# Patient Record
Sex: FEMALE | Race: WHITE | NOT HISPANIC OR LATINO | Employment: FULL TIME | URBAN - METROPOLITAN AREA
[De-identification: names, ages, dates, MRNs, and addresses within clinical notes are randomized per-mention and may not be internally consistent; named-entity substitution may affect disease eponyms.]

---

## 2017-05-28 ENCOUNTER — HOSPITAL ENCOUNTER (EMERGENCY)
Facility: HOSPITAL | Age: 52
Discharge: HOME | End: 2017-05-28
Attending: EMERGENCY MEDICINE
Payer: COMMERCIAL

## 2017-05-28 VITALS
SYSTOLIC BLOOD PRESSURE: 129 MMHG | BODY MASS INDEX: 39.87 KG/M2 | RESPIRATION RATE: 18 BRPM | DIASTOLIC BLOOD PRESSURE: 70 MMHG | OXYGEN SATURATION: 97 % | TEMPERATURE: 99 F | HEIGHT: 63 IN | WEIGHT: 225 LBS | HEART RATE: 113 BPM

## 2017-05-28 DIAGNOSIS — J02.9 PHARYNGITIS, UNSPECIFIED ETIOLOGY: ICD-10-CM

## 2017-05-28 DIAGNOSIS — T78.40XA ALLERGIC REACTION CAUSED BY A DRUG, INITIAL ENCOUNTER: Primary | ICD-10-CM

## 2017-05-28 PROCEDURE — 96361 HYDRATE IV INFUSION ADD-ON: CPT

## 2017-05-28 PROCEDURE — 96374 THER/PROPH/DIAG INJ IV PUSH: CPT

## 2017-05-28 PROCEDURE — 99284 EMERGENCY DEPT VISIT MOD MDM: CPT | Mod: 25

## 2017-05-28 PROCEDURE — 25000003 PHARM REV CODE 250: Performed by: EMERGENCY MEDICINE

## 2017-05-28 RX ORDER — FAMOTIDINE 20 MG/1
20 TABLET, FILM COATED ORAL 2 TIMES DAILY
Qty: 20 TABLET | Refills: 0 | Status: ON HOLD | OUTPATIENT
Start: 2017-05-28 | End: 2019-09-09 | Stop reason: CLARIF

## 2017-05-28 RX ORDER — MODAFINIL 200 MG/1
200 TABLET ORAL DAILY
Status: ON HOLD | COMMUNITY
End: 2019-09-09 | Stop reason: CLARIF

## 2017-05-28 RX ORDER — DOCUSATE SODIUM 100 MG/1
100 CAPSULE, LIQUID FILLED ORAL 2 TIMES DAILY
COMMUNITY

## 2017-05-28 RX ORDER — ASPIRIN 81 MG/1
81 TABLET ORAL DAILY
COMMUNITY
End: 2019-09-08

## 2017-05-28 RX ORDER — PANTOPRAZOLE SODIUM 40 MG/1
40 TABLET, DELAYED RELEASE ORAL DAILY
Status: ON HOLD | COMMUNITY
End: 2019-09-09 | Stop reason: CLARIF

## 2017-05-28 RX ORDER — DIPHENHYDRAMINE HCL 25 MG
25 CAPSULE ORAL EVERY 6 HOURS PRN
Qty: 20 CAPSULE | Refills: 0 | Status: ON HOLD | OUTPATIENT
Start: 2017-05-28 | End: 2019-09-09 | Stop reason: CLARIF

## 2017-05-28 RX ORDER — ASCORBIC ACID 1000 MG
175 TABLET ORAL DAILY
COMMUNITY

## 2017-05-28 RX ORDER — FERROUS SULFATE, DRIED 160(50) MG
1 TABLET, EXTENDED RELEASE ORAL 2 TIMES DAILY WITH MEALS
COMMUNITY
End: 2019-09-08

## 2017-05-28 RX ORDER — FAMOTIDINE 10 MG/ML
20 INJECTION INTRAVENOUS
Status: COMPLETED | OUTPATIENT
Start: 2017-05-28 | End: 2017-05-28

## 2017-05-28 RX ORDER — AZITHROMYCIN 250 MG/1
250 TABLET, FILM COATED ORAL DAILY
Qty: 6 TABLET | Refills: 0 | Status: SHIPPED | OUTPATIENT
Start: 2017-05-28 | End: 2018-11-14

## 2017-05-28 RX ORDER — PREDNISONE 10 MG/1
10 TABLET ORAL DAILY
Qty: 21 TABLET | Refills: 0 | Status: SHIPPED | OUTPATIENT
Start: 2017-05-28 | End: 2017-06-07

## 2017-05-28 RX ORDER — SODIUM CHLORIDE 9 MG/ML
1000 INJECTION, SOLUTION INTRAVENOUS
Status: COMPLETED | OUTPATIENT
Start: 2017-05-28 | End: 2017-05-28

## 2017-05-28 RX ORDER — DICYCLOMINE HYDROCHLORIDE 10 MG/1
10 CAPSULE ORAL
COMMUNITY
End: 2019-09-08

## 2017-05-28 RX ADMIN — FAMOTIDINE 20 MG: 10 INJECTION, SOLUTION INTRAVENOUS at 05:05

## 2017-05-28 RX ADMIN — SODIUM CHLORIDE 1000 ML: 0.9 INJECTION, SOLUTION INTRAVENOUS at 05:05

## 2017-05-28 NOTE — ED NOTES
Level of Consciousness: Patient is awake, alert, oriented to person, place, time, and situation.   Appearance: Pt resting comfortably in stretcher, no acute distress at this time. Clothing appropriately placed and clean. Hygiene is appropriate.   Skin: Skin is warm, dry, and intact. Skin turgor is normal/elastic. Mucous membranes moist. Skin color is normal for ethnicity. No skin breakdown noted. No redness or hives noted, slight edema noted to fingers bilaterally.    Musculoskeletal: Moves all extremities well. Full active ROM. No deformities noted. Denies any weakness. Gait steady, ambulates without use of assistive devices.   Respiratory: Airway open and patent. Respirations equal and unlabored. Breath sounds clear to auscultation. Denies any SOB.   Cardiac: Regular rate and rhythm. No peripheral edema noted. Radial and pedal pulses present and normal. Capillary refill is within normal limits. Denies chest pain.   GI: Abdomen soft. Bowel sounds present and active in all quads. Abdomen symmetric with no distention noted. Denies any N/V/D.   Neurological: Symmetrical expressions noted to face. Equal bilateral . Normal sensation reported to all extremities. No obvious neurological deficits noted.   Psychosocial: Speech spontaneous, clear, and coherent.

## 2017-05-28 NOTE — ED NOTES
Patient placed on continuous cardiac monitor, automatic blood pressure cuff and continuous pulse oximeter.  Sinus tach 120

## 2017-05-28 NOTE — ED NOTES
Pt states she was given rocephin and decadron at urgent care, shortly thereafter began experiencing hives/rash with shortness of breath.  Ambulance called, pt transported to [facility].  Pt given benadryl 50 mg and Epi 0.3 mg SC.  Pt states symptoms improved.  Upon arrival to [facility], pt in no distress, states she was feeling much better.  Family at bedside.  Pt denies cp, states her breathing has improved dramatically

## 2017-05-28 NOTE — ED NOTES
Bed: 11  Expected date:   Expected time:   Means of arrival:   Comments:  JANETH Ayala NP  05/28/17 1430

## 2017-05-28 NOTE — ED PROVIDER NOTES
SCRIBE #1 NOTE: I, Lesa Denis, am scribing for, and in the presence of, Josr Garcia Jr., MD. I have scribed the entire note.      History      Chief Complaint   Patient presents with    Allergic Reaction       Review of patient's allergies indicates:   Allergen Reactions    Rocephin [ceftriaxone] Shortness Of Breath    Augmentin [amoxicillin-pot clavulanate] Rash        HPI   HPI    2017, 4:53 PM   History obtained from the patient      History of Present Illness: Beatriz Valente is a 51 y.o. female patient who presents to the Emergency Department for allergic reaction which onset gradually today. Symptoms are constant and moderate in severity. Pt states that she was given a rocephin shot at urgent care. Pt was given steroids, antihistamines, and breathing tx en route to ED. No mitigating or exacerbating factors reported. Associated sxs include generalized itching, rashes, SOB and swelling to lips, bilateral hands, and bilateral feet. Patient denies any fever, n/v/d, abd pain, CP, trouble swallowing, chest tightness, wheezing, coughing, trouble swallowing, numbness, weakness, HA, dizziness, weakness, back pain, flank pain, dysuria, hematuria, and all other sxs at this time. No further complaints or concerns at this time.         Arrival mode: EMS    PCP: Bryson Nova MD       Past Medical History:  Past Medical History:   Diagnosis Date    GERD (gastroesophageal reflux disease)     Sleep apnea        Past Surgical History:  Past Surgical History:   Procedure Laterality Date    BREAST LUMPECTOMY Right     CARPAL TUNNEL RELEASE       SECTION      CHOLECYSTECTOMY      TONSILLECTOMY  1969         Family History:  History reviewed. No pertinent family history.    Social History:  Social History     Social History Main Topics    Smoking status: Current Every Day Smoker     Packs/day: 1.00     Types: Cigarettes    Smokeless tobacco: Unknown    Alcohol use Yes    Drug use: No    Sexual  activity: Yes       ROS   Review of Systems   Constitutional: Negative for fever.   HENT: Positive for facial swelling (lips). Negative for sore throat and trouble swallowing.    Respiratory: Positive for shortness of breath. Negative for cough, chest tightness and wheezing.    Cardiovascular: Negative for chest pain.   Gastrointestinal: Negative for abdominal pain, constipation, diarrhea, nausea and vomiting.   Genitourinary: Negative for decreased urine volume, difficulty urinating, dysuria, flank pain and hematuria.   Musculoskeletal: Negative for back pain.        (+) bilateral feet swelling  (+) bilateral hand swelling   Skin: Positive for rash.        (+) generalized itching   Neurological: Negative for dizziness, weakness, numbness and headaches.   Hematological: Does not bruise/bleed easily.       Physical Exam      Initial Vitals [05/28/17 1643]   BP Pulse Resp Temp SpO2   104/80 (!) 126 20 98.1 °F (36.7 °C) 97 %      Physical Exam  Nursing Notes and Vital Signs Reviewed.  Constitutional: Patient is in no acute distress. Well-developed and well-nourished.  Head: Atraumatic. Normocephalic.  Eyes: PERRL. EOM intact. Conjunctivae are not pale. No scleral icterus.  ENT: Mucous membranes are moist. Oropharynx is clear and symmetric.    Neck: Supple. Full ROM. No lymphadenopathy.  Cardiovascular: Regular rate. Regular rhythm. No murmurs, rubs, or gallops. Distal pulses are 2+ and symmetric.  Pulmonary/Chest: No respiratory distress. Clear to auscultation bilaterally. No wheezing, rales, or rhonchi.  Abdominal: Soft and non-distended.  There is no tenderness.  No rebound, guarding, or rigidity. Good bowel sounds.  Musculoskeletal: Moves all extremities. No obvious deformities. No edema. No calf tenderness.  Skin: Warm and dry. Red in the face. Macular rash to the bilateral arms.   Neurological:  Alert, awake, and appropriate.  Normal speech.  No acute focal neurological deficits are appreciated.  Psychiatric:  "Normal affect. Good eye contact. Appropriate in content.    ED Course    Procedures  ED Vital Signs:  Vitals:    05/28/17 1643 05/28/17 1801 05/28/17 1841   BP: 104/80 126/75 137/73   Pulse: (!) 126 (!) 114 (!) 120   Resp: 20 18 18   Temp: 98.1 °F (36.7 °C)     TempSrc: Oral     SpO2: 97% 98% 98%   Weight: 102.1 kg (225 lb)     Height: 5' 3" (1.6 m)              The Emergency Provider reviewed the vital signs and test results, which are outlined above.    ED Discussion     6:30 PM: Re-evaluated pt. Pt is resting comfortably and is in no acute distress.  Pt states that she is feeling better.  D/w pt all pertinent results. D/w pt any concerns expressed at this time. Answered all questions. Pt expresses understanding at this time.    6:52 PM: Reassessed pt at this time. Pt is awake and alert. Pt states her condition has improved at this time. Discussed with pt all pertinent ED information and results. Discussed pt dx and plan of tx. Gave pt all f/u and return to the ED instructions. All questions and concerns were addressed at this time. Pt expresses understanding of information and instructions, and is comfortable with plan to discharge. Pt is stable for discharge.      ED Medication(s):  Medications   0.9%  NaCl infusion (1,000 mLs Intravenous New Bag 5/28/17 1730)   famotidine (PF) injection 20 mg (20 mg Intravenous Given 5/28/17 1730)       New Prescriptions    AZITHROMYCIN (Z-SHABANA) 250 MG TABLET    Take 1 tablet (250 mg total) by mouth once daily. Take first 2 tablets together, then 1 every day until finished.    DIPHENHYDRAMINE (BENADRYL) 25 MG CAPSULE    Take 1 each (25 mg total) by mouth every 6 (six) hours as needed for Itching or Allergies.    FAMOTIDINE (PEPCID) 20 MG TABLET    Take 1 tablet (20 mg total) by mouth 2 (two) times daily.    PREDNISONE (DELTASONE) 10 MG TABLET    Take 1 tablet (10 mg total) by mouth once daily. Take 4 tabs x 3 days, then  Take 2 tabs x 3 days, then   Take 1 tab x 3 days. "       Follow-up Information     Bryson Nova MD. Schedule an appointment as soon as possible for a visit in 2 days.    Specialty:  Family Medicine  Why:  As needed- do nt take any penicillin or related antibiotic  Contact information:  [id number]  [place] [address]  [phone]                     Medical Decision Making              Scribe Attestation:   Scribe #1: I performed the above scribed service and the documentation accurately describes the services I performed. I attest to the accuracy of the note.    Attending:   Physician Attestation Statement for Scribe #1: I, Josr Garcia Jr., MD, personally performed the services described in this documentation, as scribed by Lesa Denis, in my presence, and it is both accurate and complete.          Clinical Impression       ICD-10-CM ICD-9-CM   1. Allergic reaction caused by a drug, initial encounter T78.40XA 995.27   2. Pharyngitis, unspecified etiology J02.9 462       Disposition:   Disposition: Discharged  Condition: Stable         Josr Garcia Jr., MD  05/29/17 0102

## 2017-05-28 NOTE — ED NOTES
Pt resting comfortably, states no needs at this time.  Pt states she feels well enough to return home.  MD notified

## 2017-05-29 NOTE — ED NOTES
MD Garcia aware of HR. MD states to continue with discharge. Pt states that she is ready for discharge. NAD noted. RR e/u, airway open and patent. Pt denies CP or SOB. Will continue with discharge.

## 2018-11-14 PROBLEM — E78.2 MIXED HYPERLIPIDEMIA: Status: ACTIVE | Noted: 2018-11-14

## 2018-11-14 PROBLEM — C79.51 LUNG CANCER METASTATIC TO BONE: Status: ACTIVE | Noted: 2018-11-14

## 2018-11-14 PROBLEM — Z72.0 TOBACCO ABUSE: Status: ACTIVE | Noted: 2018-11-14

## 2018-11-14 PROBLEM — C34.90 LUNG CANCER METASTATIC TO BONE: Status: ACTIVE | Noted: 2018-11-14

## 2019-09-08 ENCOUNTER — HOSPITAL ENCOUNTER (INPATIENT)
Facility: HOSPITAL | Age: 54
LOS: 4 days | Discharge: HOME | DRG: 023 | End: 2019-09-12
Attending: EMERGENCY MEDICINE | Admitting: PSYCHIATRY & NEUROLOGY
Payer: COMMERCIAL

## 2019-09-08 ENCOUNTER — HOSPITAL ENCOUNTER (EMERGENCY)
Facility: HOSPITAL | Age: 54
Discharge: ANOTHER HOSPITAL | End: 2019-09-08
Attending: EMERGENCY MEDICINE
Payer: COMMERCIAL

## 2019-09-08 VITALS
DIASTOLIC BLOOD PRESSURE: 95 MMHG | BODY MASS INDEX: 34.16 KG/M2 | RESPIRATION RATE: 22 BRPM | HEART RATE: 116 BPM | TEMPERATURE: 99 F | OXYGEN SATURATION: 100 % | HEIGHT: 63 IN | SYSTOLIC BLOOD PRESSURE: 143 MMHG | WEIGHT: 192.81 LBS

## 2019-09-08 DIAGNOSIS — Z00.00 EVALUATION BY MEDICAL SERVICE REQUIRED: ICD-10-CM

## 2019-09-08 DIAGNOSIS — I63.9 CVA (CEREBRAL VASCULAR ACCIDENT): ICD-10-CM

## 2019-09-08 DIAGNOSIS — C34.90 LUNG CANCER METASTATIC TO BONE: ICD-10-CM

## 2019-09-08 DIAGNOSIS — I63.511 ACUTE ISCHEMIC RIGHT MCA STROKE: ICD-10-CM

## 2019-09-08 DIAGNOSIS — I63.9 STROKE: ICD-10-CM

## 2019-09-08 DIAGNOSIS — C79.51 LUNG CANCER METASTATIC TO BONE: ICD-10-CM

## 2019-09-08 DIAGNOSIS — I63.411 CEREBRAL INFARCTION DUE TO EMBOLISM OF RIGHT MIDDLE CEREBRAL ARTERY: Primary | ICD-10-CM

## 2019-09-08 PROBLEM — E11.9 DIABETES MELLITUS TYPE II, CONTROLLED: Status: ACTIVE | Noted: 2019-09-08

## 2019-09-08 PROBLEM — E78.5 HLD (HYPERLIPIDEMIA): Status: ACTIVE | Noted: 2019-09-08

## 2019-09-08 PROBLEM — D69.6 THROMBOCYTOPENIA: Status: ACTIVE | Noted: 2019-09-08

## 2019-09-08 LAB
ABO + RH BLD: NORMAL
ALBUMIN SERPL BCP-MCNC: 3.3 G/DL (ref 3.5–5.2)
ALP SERPL-CCNC: 113 U/L (ref 55–135)
ALT SERPL W/O P-5'-P-CCNC: 19 U/L (ref 10–44)
ANION GAP SERPL CALC-SCNC: 15 MMOL/L (ref 8–16)
ANISOCYTOSIS BLD QL SMEAR: SLIGHT
AST SERPL-CCNC: 31 U/L (ref 10–40)
BASOPHILS # BLD AUTO: ABNORMAL K/UL (ref 0–0.2)
BASOPHILS NFR BLD: 0 % (ref 0–1.9)
BILIRUB SERPL-MCNC: 0.4 MG/DL (ref 0.1–1)
BLD GP AB SCN CELLS X3 SERPL QL: NORMAL
BUN SERPL-MCNC: 15 MG/DL (ref 6–20)
CALCIUM SERPL-MCNC: 9.8 MG/DL (ref 8.7–10.5)
CHLORIDE SERPL-SCNC: 104 MMOL/L (ref 95–110)
CHOLEST SERPL-MCNC: 181 MG/DL (ref 120–199)
CHOLEST SERPL-MCNC: 199 MG/DL (ref 120–199)
CHOLEST/HDLC SERPL: 4.9 {RATIO} (ref 2–5)
CHOLEST/HDLC SERPL: 4.9 {RATIO} (ref 2–5)
CO2 SERPL-SCNC: 23 MMOL/L (ref 23–29)
CREAT SERPL-MCNC: 1 MG/DL (ref 0.5–1.4)
DACRYOCYTES BLD QL SMEAR: ABNORMAL
DIFFERENTIAL METHOD: ABNORMAL
EOSINOPHIL # BLD AUTO: ABNORMAL K/UL (ref 0–0.5)
EOSINOPHIL NFR BLD: 4 % (ref 0–8)
ERYTHROCYTE [DISTWIDTH] IN BLOOD BY AUTOMATED COUNT: 16.7 % (ref 11.5–14.5)
EST. GFR  (AFRICAN AMERICAN): >60 ML/MIN/1.73 M^2
EST. GFR  (NON AFRICAN AMERICAN): >60 ML/MIN/1.73 M^2
ESTIMATED AVG GLUCOSE: 82 MG/DL (ref 68–131)
GLUCOSE SERPL-MCNC: 172 MG/DL (ref 70–110)
HBA1C MFR BLD HPLC: 4.5 % (ref 4–5.6)
HCT VFR BLD AUTO: 33.1 % (ref 37–48.5)
HDLC SERPL-MCNC: 37 MG/DL (ref 40–75)
HDLC SERPL-MCNC: 41 MG/DL (ref 40–75)
HDLC SERPL: 20.4 % (ref 20–50)
HDLC SERPL: 20.6 % (ref 20–50)
HGB BLD-MCNC: 10.7 G/DL (ref 12–16)
INR PPP: 0.9 (ref 0.8–1.2)
LDLC SERPL CALC-MCNC: 107.6 MG/DL (ref 63–159)
LDLC SERPL CALC-MCNC: 119.4 MG/DL (ref 63–159)
LYMPHOCYTES # BLD AUTO: ABNORMAL K/UL (ref 1–4.8)
LYMPHOCYTES NFR BLD: 26 % (ref 18–48)
MCH RBC QN AUTO: 33.4 PG (ref 27–31)
MCHC RBC AUTO-ENTMCNC: 32.3 G/DL (ref 32–36)
MCV RBC AUTO: 103 FL (ref 82–98)
MONOCYTES # BLD AUTO: ABNORMAL K/UL (ref 0.3–1)
MONOCYTES NFR BLD: 0 % (ref 4–15)
NEUTROPHILS NFR BLD: 68 % (ref 38–73)
NEUTS BAND NFR BLD MANUAL: 2 %
NONHDLC SERPL-MCNC: 144 MG/DL
NONHDLC SERPL-MCNC: 158 MG/DL
OVALOCYTES BLD QL SMEAR: ABNORMAL
PLATELET # BLD AUTO: 40 K/UL (ref 150–350)
PLATELET BLD QL SMEAR: ABNORMAL
PMV BLD AUTO: ABNORMAL FL (ref 9.2–12.9)
POCT GLUCOSE: 115 MG/DL (ref 70–110)
POCT GLUCOSE: 155 MG/DL (ref 70–110)
POCT GLUCOSE: 99 MG/DL (ref 70–110)
POIKILOCYTOSIS BLD QL SMEAR: SLIGHT
POTASSIUM SERPL-SCNC: 4 MMOL/L (ref 3.5–5.1)
PROT SERPL-MCNC: 7.6 G/DL (ref 6–8.4)
PROTHROMBIN TIME: 9.9 SEC (ref 9–12.5)
RBC # BLD AUTO: 3.2 M/UL (ref 4–5.4)
SODIUM SERPL-SCNC: 142 MMOL/L (ref 136–145)
TARGETS BLD QL SMEAR: ABNORMAL
TRIGL SERPL-MCNC: 182 MG/DL (ref 30–150)
TRIGL SERPL-MCNC: 193 MG/DL (ref 30–150)
TSH SERPL DL<=0.005 MIU/L-ACNC: 1.69 UIU/ML (ref 0.4–4)
TSH SERPL DL<=0.005 MIU/L-ACNC: 2.4 UIU/ML (ref 0.4–4)
WBC # BLD AUTO: 1.72 K/UL (ref 3.9–12.7)

## 2019-09-08 PROCEDURE — 85610 PROTHROMBIN TIME: CPT

## 2019-09-08 PROCEDURE — 84443 ASSAY THYROID STIM HORMONE: CPT

## 2019-09-08 PROCEDURE — 63600175 PHARM REV CODE 636 W HCPCS: Mod: JG | Performed by: EMERGENCY MEDICINE

## 2019-09-08 PROCEDURE — 20000000 HC ICU ROOM

## 2019-09-08 PROCEDURE — 93010 EKG 12-LEAD: ICD-10-PCS | Mod: ,,, | Performed by: INTERNAL MEDICINE

## 2019-09-08 PROCEDURE — 84443 ASSAY THYROID STIM HORMONE: CPT | Mod: 91

## 2019-09-08 PROCEDURE — 94761 N-INVAS EAR/PLS OXIMETRY MLT: CPT

## 2019-09-08 PROCEDURE — 25500020 PHARM REV CODE 255: Performed by: EMERGENCY MEDICINE

## 2019-09-08 PROCEDURE — 82962 GLUCOSE BLOOD TEST: CPT | Mod: 59

## 2019-09-08 PROCEDURE — 96374 THER/PROPH/DIAG INJ IV PUSH: CPT

## 2019-09-08 PROCEDURE — 80061 LIPID PANEL: CPT | Mod: 91

## 2019-09-08 PROCEDURE — 99223 PR INITIAL HOSPITAL CARE,LEVL III: ICD-10-PCS | Mod: ,,, | Performed by: PSYCHIATRY & NEUROLOGY

## 2019-09-08 PROCEDURE — 63600175 PHARM REV CODE 636 W HCPCS: Performed by: RADIOLOGY

## 2019-09-08 PROCEDURE — 93005 ELECTROCARDIOGRAM TRACING: CPT

## 2019-09-08 PROCEDURE — 99900035 HC TECH TIME PER 15 MIN (STAT)

## 2019-09-08 PROCEDURE — 85007 BL SMEAR W/DIFF WBC COUNT: CPT

## 2019-09-08 PROCEDURE — 99223 1ST HOSP IP/OBS HIGH 75: CPT | Mod: ,,, | Performed by: PSYCHIATRY & NEUROLOGY

## 2019-09-08 PROCEDURE — 80061 LIPID PANEL: CPT

## 2019-09-08 PROCEDURE — 85027 COMPLETE CBC AUTOMATED: CPT

## 2019-09-08 PROCEDURE — 27000221 HC OXYGEN, UP TO 24 HOURS

## 2019-09-08 PROCEDURE — 99284 EMERGENCY DEPT VISIT MOD MDM: CPT | Mod: ,,, | Performed by: EMERGENCY MEDICINE

## 2019-09-08 PROCEDURE — 99291 CRITICAL CARE FIRST HOUR: CPT | Mod: 25

## 2019-09-08 PROCEDURE — 99285 EMERGENCY DEPT VISIT HI MDM: CPT | Mod: 25

## 2019-09-08 PROCEDURE — 25000003 PHARM REV CODE 250: Performed by: PSYCHIATRY & NEUROLOGY

## 2019-09-08 PROCEDURE — 93010 ELECTROCARDIOGRAM REPORT: CPT | Mod: ,,, | Performed by: INTERNAL MEDICINE

## 2019-09-08 PROCEDURE — 86901 BLOOD TYPING SEROLOGIC RH(D): CPT

## 2019-09-08 PROCEDURE — 83036 HEMOGLOBIN GLYCOSYLATED A1C: CPT

## 2019-09-08 PROCEDURE — 99900026 HC AIRWAY MAINTENANCE (STAT)

## 2019-09-08 PROCEDURE — 80053 COMPREHEN METABOLIC PANEL: CPT

## 2019-09-08 PROCEDURE — 63600175 PHARM REV CODE 636 W HCPCS: Performed by: PSYCHIATRY & NEUROLOGY

## 2019-09-08 PROCEDURE — 99284 PR EMERGENCY DEPT VISIT,LEVEL IV: ICD-10-PCS | Mod: ,,, | Performed by: EMERGENCY MEDICINE

## 2019-09-08 PROCEDURE — 25500020 PHARM REV CODE 255: Performed by: PSYCHIATRY & NEUROLOGY

## 2019-09-08 RX ORDER — ONDANSETRON 2 MG/ML
INJECTION INTRAMUSCULAR; INTRAVENOUS CODE/TRAUMA/SEDATION MEDICATION
Status: COMPLETED | OUTPATIENT
Start: 2019-09-08 | End: 2019-09-08

## 2019-09-08 RX ORDER — FENTANYL CITRATE 50 UG/ML
INJECTION, SOLUTION INTRAMUSCULAR; INTRAVENOUS
Status: DISPENSED
Start: 2019-09-08 | End: 2019-09-09

## 2019-09-08 RX ORDER — SODIUM CHLORIDE 0.9 % (FLUSH) 0.9 %
10 SYRINGE (ML) INJECTION
Status: DISCONTINUED | OUTPATIENT
Start: 2019-09-08 | End: 2019-09-12 | Stop reason: HOSPADM

## 2019-09-08 RX ORDER — IODIXANOL 320 MG/ML
200 INJECTION, SOLUTION INTRAVASCULAR
Status: COMPLETED | OUTPATIENT
Start: 2019-09-08 | End: 2019-09-08

## 2019-09-08 RX ORDER — OXYCODONE HYDROCHLORIDE 5 MG/1
5 TABLET ORAL EVERY 4 HOURS PRN
Status: DISCONTINUED | OUTPATIENT
Start: 2019-09-08 | End: 2019-09-12 | Stop reason: HOSPADM

## 2019-09-08 RX ORDER — ACETAMINOPHEN 325 MG/1
650 TABLET ORAL EVERY 6 HOURS PRN
Status: DISCONTINUED | OUTPATIENT
Start: 2019-09-08 | End: 2019-09-12 | Stop reason: HOSPADM

## 2019-09-08 RX ORDER — MIDAZOLAM HYDROCHLORIDE 1 MG/ML
INJECTION INTRAMUSCULAR; INTRAVENOUS CODE/TRAUMA/SEDATION MEDICATION
Status: COMPLETED | OUTPATIENT
Start: 2019-09-08 | End: 2019-09-08

## 2019-09-08 RX ORDER — NICARDIPINE HYDROCHLORIDE 0.2 MG/ML
5 INJECTION INTRAVENOUS CONTINUOUS
Status: DISCONTINUED | OUTPATIENT
Start: 2019-09-08 | End: 2019-09-10

## 2019-09-08 RX ORDER — FENTANYL CITRATE 50 UG/ML
12.5 INJECTION, SOLUTION INTRAMUSCULAR; INTRAVENOUS ONCE
Status: COMPLETED | OUTPATIENT
Start: 2019-09-08 | End: 2019-09-08

## 2019-09-08 RX ORDER — LEVOTHYROXINE SODIUM 25 UG/1
25 TABLET ORAL DAILY
COMMUNITY

## 2019-09-08 RX ORDER — FENTANYL 25 UG/1
1 PATCH TRANSDERMAL
Status: ON HOLD | COMMUNITY
End: 2019-09-09 | Stop reason: CLARIF

## 2019-09-08 RX ADMIN — OXYCODONE HYDROCHLORIDE 5 MG: 5 TABLET ORAL at 07:09

## 2019-09-08 RX ADMIN — MIDAZOLAM HYDROCHLORIDE 1 MG: 1 INJECTION, SOLUTION INTRAMUSCULAR; INTRAVENOUS at 04:09

## 2019-09-08 RX ADMIN — ALTEPLASE 71 MG: KIT at 01:09

## 2019-09-08 RX ADMIN — IODIXANOL 60 ML: 320 INJECTION, SOLUTION INTRAVASCULAR at 05:09

## 2019-09-08 RX ADMIN — ACETAMINOPHEN 650 MG: 325 TABLET ORAL at 07:09

## 2019-09-08 RX ADMIN — OXYCODONE HYDROCHLORIDE 5 MG: 5 TABLET ORAL at 11:09

## 2019-09-08 RX ADMIN — IOHEXOL 100 ML: 350 INJECTION, SOLUTION INTRAVENOUS at 03:09

## 2019-09-08 RX ADMIN — ONDANSETRON 4 MG: 2 INJECTION INTRAMUSCULAR; INTRAVENOUS at 04:09

## 2019-09-08 RX ADMIN — FENTANYL CITRATE 12.5 MCG: 50 INJECTION INTRAMUSCULAR; INTRAVENOUS at 05:09

## 2019-09-08 NOTE — HPI
54 y/o woman with a medical history of small cell lung carcinoma (with possible metastasis to the brain receiving chemo and radiation), GERD, HLD and DM was brought into the ER at [facility] for sudden onset of left sided facial droop with left sided hemiplegia when she was visiting her daughter who was in labor. Last known normal is 12:30 PM today.  Patient denied any chest pain, SOB, neck pain or headache.    During telestroke conference NIHSS was 15, VAN +, and patient was within the tPA window. After extensive conversation with the family regarding increased ICH risk with cancer and potential metastasis patient and family was transferred here for potential IR intervention. Upon arrival stroke NIHSS still 16. CTA multiphase done showed right M2 occlusion. Decision was made to do thrombectomy.

## 2019-09-08 NOTE — SUBJECTIVE & OBJECTIVE
Past Medical History:   Diagnosis Date    Cancer 2018    Lung    Diabetes mellitus, type 2     Steriod induced, years ago, now glucose up and down    GERD (gastroesophageal reflux disease)     Hyperlipidemia     Sleep apnea      Past Surgical History:   Procedure Laterality Date    BONE BIOPSY  2018    Sacral bone    BREAST BIOPSY Right 2014    BREAST LUMPECTOMY Right     BREAST LUMPECTOMY Right 2014    CARPAL TUNNEL RELEASE       SECTION      CHOLECYSTECTOMY      DILATION AND CURETTAGE OF UTERUS  1987    ENDOMETRIAL ABLATION  02/15/2011    HAND SURGERY Left 2007    HAND SURGERY Right 2008    HEMORRHOID SURGERY  2010    LUNG BIOPSY Right 2018    MOUTH SURGERY   and     cyst removal    SALIVARY GLAND SURGERY Left 2018    Biopsy    THROAT SURGERY  10/1983    Remove vocal cord nodule    TONSILLECTOMY  1969    TUNNELED VENOUS PORT PLACEMENT  2018     History reviewed. No pertinent family history.  Social History     Tobacco Use    Smoking status: Current Every Day Smoker     Packs/day: 0.50     Types: Cigarettes     Start date:     Smokeless tobacco: Never Used   Substance Use Topics    Alcohol use: Yes     Comment: Occassional    Drug use: No     Review of patient's allergies indicates:   Allergen Reactions    Rocephin [ceftriaxone] Shortness Of Breath    Augmentin [amoxicillin-pot clavulanate] Rash       Medications: I have reviewed the current medication administration record.      (Not in a hospital admission)    Review of Systems   Constitutional: Negative for chills, diaphoresis, fatigue and fever.   Cardiovascular: Negative for chest pain, palpitations and leg swelling.   Musculoskeletal: Positive for gait problem. Negative for arthralgias, back pain, joint swelling, myalgias, neck pain and neck stiffness.   Skin: Negative for color change, pallor and rash.   Neurological: Positive for facial asymmetry,  speech difficulty and weakness. Negative for dizziness, tremors, seizures, syncope, light-headedness, numbness and headaches.     Objective:     Vital Signs (Most Recent):  Pulse: 99 (09/08/19 1651)  Resp: 20 (09/08/19 1651)  BP: 136/85 (09/08/19 1651)  SpO2: 100 % (09/08/19 1651)    Vital Signs Range (Last 24H):  Temp:  [98.5 °F (36.9 °C)-99.2 °F (37.3 °C)]   Pulse:  []   Resp:  [10-22]   BP: (126-171)/(59-98)   SpO2:  [94 %-100 %]     Physical Exam   Constitutional: She is oriented to person, place, and time. She appears well-developed and well-nourished.   Neurological: She is alert and oriented to person, place, and time. A cranial nerve deficit and sensory deficit is present. She exhibits abnormal muscle tone. Coordination abnormal.   Skin: She is not diaphoretic.       Neurological Exam:   LOC: alert  Attention Span: Good   Language: Expressive aphasia  Articulation: Dysarthria  Orientation: Person, Place, Time   Visual Fields: Visual neglect  EOM (CN III, IV, VI): Horizontal gaze paralysis   Pupils (CN II, III): PERRL  Facial Sensation (CN V): Facial sensory loss  Facial Movement (CN VII): Upper & lower facial weakness on the Left  Reflexes: 2+ throughout  Motor: Arm left  Plegia 0/5  Leg left  Plegia 0/5  Arm right  Paresis: 4/5  Leg right Paresis: 4/5  Cebellar: No evidence of appendicular or axial ataxia  Sensation: Enmanuel-hypoesthesia left  Tone: Flaccid  LUE  and LLE      Laboratory:  CMP:   Recent Labs   Lab 09/08/19  1313   CALCIUM 9.8   ALBUMIN 3.3*   PROT 7.6      K 4.0   CO2 23      BUN 15   CREATININE 1.0   ALKPHOS 113   ALT 19   AST 31   BILITOT 0.4     CBC:   Recent Labs   Lab 09/08/19  1313   WBC 1.72*   RBC 3.20*   HGB 10.7*   HCT 33.1*   PLT 40*   *   MCH 33.4*   MCHC 32.3     Lipid Panel: No results for input(s): CHOL, LDLCALC, HDL, TRIG in the last 168 hours.  Coagulation:   Recent Labs   Lab 09/08/19  1313   INR 0.9     Hgb A1C: No results for input(s): HGBA1C in the  last 168 hours.  TSH:   Recent Labs   Lab 09/08/19  1313   TSH 2.400       Diagnostic Results:      Brain imaging:      No CT evidence of acute intracranial abnormality.    Vessel Imaging:        Abrupt occlusion of the lateral M2 segment of the right MCA.  No significant parenchymal hypoattenuation, edema/ mass effect, or hemorrhage.    Cardiac Evaluation:   TTE pending

## 2019-09-08 NOTE — ASSESSMENT & PLAN NOTE
53 y.o woman with a medical history of small cell lung carcinoma (currently receiving chemo and radiation with bony metastasis), HTN, DM2, HLD, current every day smoker and BASSAM presented to [facility] with sudden onset of facial droop and left sided plegia, NIHSS 16, VAN (+), GCS 15. Decision was made to give tPA at  and transfer to [facility] for thrombectomy. Upon arrival CTA multiphase showed right M2 occlusion. Decision was made to do thrombectomy. Will be admitted to [facility] post thrombectomy. Possible etiology embolic 2/2 her current risk factors as well as hypercoagulable from her cancer.      Antithrombotics for secondary stroke prevention: Antiplatelets: None: Hold all Antithrombotics x 24 hours after IV t-PA administration    Statins for secondary stroke prevention and hyperlipidemia, if present:   Statins: Atorvastatin- 80 mg daily    Aggressive risk factor modification: HTN, Smoking, DM, HLD, lung cancer     Rehab efforts: The patient has been evaluated by a stroke team provider and the therapy needs have been fully considered based off the presenting complaints and exam findings. The following therapy evaluations are needed: PT evaluate and treat, OT evaluate and treat, SLP evaluate and treat, PM&R evaluate for appropriate placement    Diagnostics ordered/pending: TTE to assess cardiac function/status     VTE prophylaxis: None: Reason for No Pharmacological VTE Prophylaxis: Mechanical prophylaxis: Place SCDs- hold VTE prophylaxis 24 hours post tpa     BP parameters: Infarct: Post sucessful thrombectomy, SBP <140

## 2019-09-08 NOTE — H&P
[facility]  Neurocritical Care  Progress Note    Admit Date: 2019  Service Date: 2019  Length of Stay: 0    Subjective:     Chief Complaint: Acute ischemic right MCA stroke    History of Present Illness: Pt is a 54 yo female with a PMHx of HTN, HLD, DM II, BASSAM, and SCLC with mets seen on last MRI () presents from OSH to [facility] for RMCA syndrome.  Pt was with her daughter in [place] when the pt acutely presented with left-sided weakness, facial droop, and slurred speech. Telestroke was consulted. Pt presented with an NIHSS 16, VAN (+), GCS (15). The risk of  Increased hemorrhage and tPA administration to pt with cancer were presented and discussed. Pt and family decided to receive tPA (decision to treat [id number]) treatment and transfe to [facility] for higher level of care with potential intervention. At [facility], imaging was performed and pt was found to have a R M2 occlusion. Pt was taken for IR for thrombectomy. Pt admitted to [facility] for higher of care and monitoring post procedure.     Pt history was obtained per chart review.      Hospital Course: 2019 tPA 1326 OSH, transfer to [facility], pt taken for IR, admit to [facility]         Past Medical History:   Diagnosis Date    Cancer 2018    Lung    Diabetes mellitus, type 2     Steriod induced, years ago, now glucose up and down    GERD (gastroesophageal reflux disease)     Hyperlipidemia     Sleep apnea      Past Surgical History:   Procedure Laterality Date    BONE BIOPSY  2018    Sacral bone    BREAST BIOPSY Right 2014    BREAST LUMPECTOMY Right     BREAST LUMPECTOMY Right 2014    CARPAL TUNNEL RELEASE       SECTION      CHOLECYSTECTOMY      DILATION AND CURETTAGE OF UTERUS  1987    ENDOMETRIAL ABLATION  02/15/2011    HAND SURGERY Left 2007    HAND SURGERY Right 2008    HEMORRHOID SURGERY  2010    LUNG BIOPSY Right 2018    MOUTH SURGERY   and     cyst removal    SALIVARY  GLAND SURGERY Left 04/16/2018    Biopsy    THROAT SURGERY  10/1983    Remove vocal cord nodule    TONSILLECTOMY  1969    TUNNELED VENOUS PORT PLACEMENT  04/18/2018      Current Facility-Administered Medications on File Prior to Encounter   Medication Dose Route Frequency Provider Last Rate Last Dose    [COMPLETED] alteplase (ACTIVASE) 100 mg injection 71 mg  0.81 mg/kg Intravenous ED 1 Time Tien Alberts Jr., MD 71 mL/hr at 09/08/19 1349 71 mg at 09/08/19 1349    [COMPLETED] ALTEPLASE IV BOLUS bolus from vial 8 mg  0.09 mg/kg Intravenous ED 1 Time Tien Alberts Jr., MD   Stopped at 09/08/19 1349     Current Outpatient Medications on File Prior to Encounter   Medication Sig Dispense Refill    albuterol (PROAIR HFA) 90 mcg/actuation inhaler Inhale 2 puffs into the lungs every 6 (six) hours as needed for Wheezing. Rescue      diphenhydrAMINE (BENADRYL) 25 mg capsule Take 1 each (25 mg total) by mouth every 6 (six) hours as needed for Itching or Allergies. 20 capsule 0    docusate sodium (COLACE) 100 MG capsule Take 100 mg by mouth 2 (two) times daily.      famotidine (PEPCID) 20 MG tablet Take 1 tablet (20 mg total) by mouth 2 (two) times daily. 20 tablet 0    fentaNYL (DURAGESIC) 25 mcg/hr Place 1 patch onto the skin every 72 hours.      flaxseed oil 1,000 mg Cap Take 1 capsule by mouth once daily.      fluticasone (FLONASE) 50 mcg/actuation nasal spray 1 spray by Each Nare route daily as needed for Rhinitis.      levothyroxine (SYNTHROID) 25 MCG tablet Take 25 mcg by mouth once daily.      milk thistle 175 mg tablet Take 175 mg by mouth once daily.      modafinil (PROVIGIL) 200 MG Tab Take 200 mg by mouth once daily.      multivitamin capsule Take 1 capsule by mouth once daily.      omega 3-dha-epa-fish oil (FISH OIL) 1,000 mg (120 mg-180 mg) Cap Take 1 capsule by mouth once daily.      ondansetron (ZOFRAN) 8 MG tablet Take 8 mg by mouth every 8 (eight) hours.      oxyCODONE-acetaminophen  (PERCOCET)  mg per tablet Take 1 tablet by mouth 2 (two) times daily.      pantoprazole (PROTONIX) 40 MG tablet Take 40 mg by mouth once daily.      [DISCONTINUED] aspirin (ECOTRIN) 81 MG EC tablet Take 81 mg by mouth once daily.      [DISCONTINUED] calcium-vitamin D3 (CALCIUM 500 + D) 500 mg(1,250mg) -200 unit per tablet Take 1 tablet by mouth 2 (two) times daily with meals.      [DISCONTINUED] dicyclomine (BENTYL) 10 MG capsule Take 10 mg by mouth 4 (four) times daily before meals and nightly.      [DISCONTINUED] ipratropium (ATROVENT) 0.03 % nasal spray 2 sprays by Nasal route 3 (three) times daily. 30 mL 0      Allergies: Rocephin [ceftriaxone] and Augmentin [amoxicillin-pot clavulanate]    History reviewed. No pertinent family history.  Social History     Tobacco Use    Smoking status: Current Every Day Smoker     Packs/day: 0.50     Types: Cigarettes     Start date: 1980    Smokeless tobacco: Never Used   Substance Use Topics    Alcohol use: Yes     Comment: Occassional    Drug use: No     Review of Systems   Constitutional: Negative for fever.   Eyes: Negative for photophobia and visual disturbance.   Respiratory: Positive for shortness of breath.    Cardiovascular: Negative for chest pain and palpitations.   Gastrointestinal: Negative for diarrhea, nausea and vomiting.   Genitourinary: Negative for difficulty urinating.   Neurological: Positive for weakness, numbness and headaches. Negative for dizziness and speech difficulty.     Objective:     Vitals:    Pulse: (!) 111  Rhythm: sinus tachycardia  BP: (!) 140/70  MAP (mmHg): 85  Resp: 20  SpO2: 100 %    Temp  Min: 98.5 °F (36.9 °C)  Max: 99.2 °F (37.3 °C)  Pulse  Min: 96  Max: 122  BP  Min: 126/59  Max: 171/79  MAP (mmHg)  Min: 85  Max: 113  Resp  Min: 10  Max: 22  SpO2  Min: 94 %  Max: 100 %  Oxygen Concentration (%)  Min: 32  Max: 32    No intake/output data recorded.           Physical Exam   Constitutional: She appears well-developed and  well-nourished.   HENT:   Head: Normocephalic.   Cardiovascular: Normal rate and regular rhythm.   Pulmonary/Chest: Effort normal and breath sounds normal.   Abdominal: Soft. Bowel sounds are normal.   Neurological:   AAOx4, follows commands   E4V5M6 GCS (15)   PERRLA   EOMi   GARAY spontaneously and against gravity   Sensation intact bilaterally        Today I personally reviewed pertinent medications, lines/drains/airways, imaging, cardiology results, laboratory results, microbiology results, notably:        Assessment/Plan:     Neuro  * Acute ischemic right MCA stroke  53 pt was in [place] when she presented with acute LSW, L facial droop and slurred speech. Pt was given tPA 1326 and transferred to [facility] for IR and intervention.     -admitted to [facility]   - neurology following   -IR following, pt taken for IR procedure for reperfusion of R M2 occlusion   -neuro checks q 1hr   -vital checsk q 1hr   -SBP <140, on Cardene gtt   -CT stroke Multiphase - abrupt occlusion of RMCA, stenosis of distal R Vertebral artery, Partially imaged R suprahilar apical lung mass correlating with pt history of lung CA   -repeat imaging MRI 9/9   -tPA end time 1326, repeat imaging 18-24 hrs   -CBC,CMP, TSH, HgA1c, Lipid Panel Pending   -EKG, Echo pending   -monitor groin site s/p procedure  -PT/OT when appropriate     CVA (cerebral vascular accident)  See RMCA above       Cardiac/Vascular  HLD (hyperlipidemia)  Triglycerides 182  Resume hold meds when appropriate       Hematology  Thrombocytopenia  PLTs 40   Given tPA 1326  Concern hypercoagulable state 2/2 to SCLC w/ mets   Trend CBC with diff       Oncology  Lung cancer metastatic to bone  PMHx of 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 on MRI (8/19)   CTA mutiphase - partially imaged right suprahilar/apical lung mass       Endocrine  Diabetes mellitus type II, controlled  HgA1c external 4.5   Glucose checks       Other  Tobacco abuse  PMhx of  SCLC    Consider nicotine patch while in inpatient  setting           The patient is being Prophylaxed for:  Venous Thromboembolism with: Mechanical  Stress Ulcer with: None  Ventilator Pneumonia with: not applicable    Activity Orders          Diet NPO: NPO starting at 09/08 1612        Full Code  I have spent 35 min with this patient, with over 50% of this time spent coordinating care and speaking with the family  Critical secondary to Patient has a condition that poses threat to life and bodily function:      Critical care was time spent personally by me on the following activities: development of treatment plan with patient or surrogate and bedside caregivers, discussions with consultants, evaluation of patient's response to treatment, examination of patient, ordering and performing treatments and interventions, ordering and review of laboratory studies, ordering and review of radiographic studies, pulse oximetry, re-evaluation of patient's condition. This critical care time did not overlap with that of any other provider or involve time for any procedures.    Ender Wilkinson PA-C  Neurocritical Care  [facility]

## 2019-09-08 NOTE — ASSESSMENT & PLAN NOTE
Unclear which medication patient takes for diabetes. In the hospital place patient on sliding scale.

## 2019-09-08 NOTE — ASSESSMENT & PLAN NOTE
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

## 2019-09-08 NOTE — ED NOTES
TPA Administration   Administered by: Mavis Alvarado ED   Bolus dose:   8  mg         given at 13:48   Infusion:   71 mg    started at 13:49   Infusion completed :    Last Seen Normal: 13:00 today

## 2019-09-08 NOTE — HPI
Pt is a 52 yo female with a PMHx of HTN, HLD, DM II, BASSAM, and SCLC with mets seen on last MRI (8/19) presents from OSH to [facility] for RMCA syndrome.  Pt was with her daughter in [place] when the pt acutely presented with left-sided weakness, facial droop, and slurred speech. Telestroke was consulted. Pt presented with an NIHSS 16, VAN (+), GCS (15). The risk of  Increased hemorrhage and tPA administration to pt with cancer were presented and discussed. Pt and family decided to receive tPA (decision to treat [id number]) treatment and transfe to [facility] for higher level of care with potential intervention. At [facility], imaging was performed and pt was found to have a R M2 occlusion. Pt was taken for IR for thrombectomy. Pt admitted to [facility] for higher of care and monitoring post procedure.     Pt history was obtained per chart review.

## 2019-09-08 NOTE — SUBJECTIVE & OBJECTIVE
Past Medical History:   Diagnosis Date    Cancer 2018    Lung    Diabetes mellitus, type 2     Steriod induced, years ago, now glucose up and down    GERD (gastroesophageal reflux disease)     Hyperlipidemia     Sleep apnea      Past Surgical History:   Procedure Laterality Date    BONE BIOPSY  2018    Sacral bone    BREAST BIOPSY Right 2014    BREAST LUMPECTOMY Right     BREAST LUMPECTOMY Right 2014    CARPAL TUNNEL RELEASE       SECTION      CHOLECYSTECTOMY      DILATION AND CURETTAGE OF UTERUS  1987    ENDOMETRIAL ABLATION  02/15/2011    HAND SURGERY Left 2007    HAND SURGERY Right 2008    HEMORRHOID SURGERY  2010    LUNG BIOPSY Right 2018    MOUTH SURGERY  1980 and 1982    cyst removal    SALIVARY GLAND SURGERY Left 2018    Biopsy    THROAT SURGERY  10/1983    Remove vocal cord nodule    TONSILLECTOMY  1969    TUNNELED VENOUS PORT PLACEMENT  2018      Current Facility-Administered Medications on File Prior to Encounter   Medication Dose Route Frequency Provider Last Rate Last Dose    [COMPLETED] alteplase (ACTIVASE) 100 mg injection 71 mg  0.81 mg/kg Intravenous ED 1 Time Tien Alberts Jr., MD 71 mL/hr at 19 1349 71 mg at 19 1349    [COMPLETED] ALTEPLASE IV BOLUS bolus from vial 8 mg  0.09 mg/kg Intravenous ED 1 Time Tien Alberts Jr., MD   Stopped at 19 1349     Current Outpatient Medications on File Prior to Encounter   Medication Sig Dispense Refill    albuterol (PROAIR HFA) 90 mcg/actuation inhaler Inhale 2 puffs into the lungs every 6 (six) hours as needed for Wheezing. Rescue      diphenhydrAMINE (BENADRYL) 25 mg capsule Take 1 each (25 mg total) by mouth every 6 (six) hours as needed for Itching or Allergies. 20 capsule 0    docusate sodium (COLACE) 100 MG capsule Take 100 mg by mouth 2 (two) times daily.      famotidine (PEPCID) 20 MG tablet Take 1 tablet (20 mg total) by mouth 2 (two) times  daily. 20 tablet 0    fentaNYL (DURAGESIC) 25 mcg/hr Place 1 patch onto the skin every 72 hours.      flaxseed oil 1,000 mg Cap Take 1 capsule by mouth once daily.      fluticasone (FLONASE) 50 mcg/actuation nasal spray 1 spray by Each Nare route daily as needed for Rhinitis.      levothyroxine (SYNTHROID) 25 MCG tablet Take 25 mcg by mouth once daily.      milk thistle 175 mg tablet Take 175 mg by mouth once daily.      modafinil (PROVIGIL) 200 MG Tab Take 200 mg by mouth once daily.      multivitamin capsule Take 1 capsule by mouth once daily.      omega 3-dha-epa-fish oil (FISH OIL) 1,000 mg (120 mg-180 mg) Cap Take 1 capsule by mouth once daily.      ondansetron (ZOFRAN) 8 MG tablet Take 8 mg by mouth every 8 (eight) hours.      oxyCODONE-acetaminophen (PERCOCET)  mg per tablet Take 1 tablet by mouth 2 (two) times daily.      pantoprazole (PROTONIX) 40 MG tablet Take 40 mg by mouth once daily.      [DISCONTINUED] aspirin (ECOTRIN) 81 MG EC tablet Take 81 mg by mouth once daily.      [DISCONTINUED] calcium-vitamin D3 (CALCIUM 500 + D) 500 mg(1,250mg) -200 unit per tablet Take 1 tablet by mouth 2 (two) times daily with meals.      [DISCONTINUED] dicyclomine (BENTYL) 10 MG capsule Take 10 mg by mouth 4 (four) times daily before meals and nightly.      [DISCONTINUED] ipratropium (ATROVENT) 0.03 % nasal spray 2 sprays by Nasal route 3 (three) times daily. 30 mL 0      Allergies: Rocephin [ceftriaxone] and Augmentin [amoxicillin-pot clavulanate]    History reviewed. No pertinent family history.  Social History     Tobacco Use    Smoking status: Current Every Day Smoker     Packs/day: 0.50     Types: Cigarettes     Start date: 1980    Smokeless tobacco: Never Used   Substance Use Topics    Alcohol use: Yes     Comment: Occassional    Drug use: No     Review of Systems   Constitutional: Negative for fever.   Eyes: Negative for photophobia and visual disturbance.   Respiratory: Positive for  shortness of breath.    Cardiovascular: Negative for chest pain and palpitations.   Gastrointestinal: Negative for diarrhea, nausea and vomiting.   Genitourinary: Negative for difficulty urinating.   Neurological: Positive for weakness, numbness and headaches. Negative for dizziness and speech difficulty.     Objective:     Vitals:    Pulse: (!) 111  Rhythm: sinus tachycardia  BP: (!) 140/70  MAP (mmHg): 85  Resp: 20  SpO2: 100 %    Temp  Min: 98.5 °F (36.9 °C)  Max: 99.2 °F (37.3 °C)  Pulse  Min: 96  Max: 122  BP  Min: 126/59  Max: 171/79  MAP (mmHg)  Min: 85  Max: 113  Resp  Min: 10  Max: 22  SpO2  Min: 94 %  Max: 100 %  Oxygen Concentration (%)  Min: 32  Max: 32    No intake/output data recorded.           Physical Exam   Constitutional: She appears well-developed and well-nourished.   HENT:   Head: Normocephalic.   Cardiovascular: Normal rate and regular rhythm.   Pulmonary/Chest: Effort normal and breath sounds normal.   Abdominal: Soft. Bowel sounds are normal.   Neurological:   AAOx4, follows commands   E4V5M6 GCS (15)   PERRLA   EOMi   GARAY spontaneously and against gravity   Sensation intact bilaterally        Today I personally reviewed pertinent medications, lines/drains/airways, imaging, cardiology results, laboratory results, microbiology results, notably:

## 2019-09-08 NOTE — ED PROVIDER NOTES
SCRIBE #1 NOTE: I, Jose Patrick, am scribing for, and in the presence of, Tien Alberts Jr., MD. I have scribed the entire note.       History     Chief Complaint   Patient presents with    Facial Droop     PT was upstairs in L and D and started having facial drooping and slurred speech.     Review of patient's allergies indicates:   Allergen Reactions    Rocephin [ceftriaxone] Shortness Of Breath    Augmentin [amoxicillin-pot clavulanate] Rash         History of Present Illness     HPI    2019, 1:05 PM   History obtained from the patient      History of Present Illness: Beatriz Valente is a 53 y.o. female patient with a PMHx of Lung CA, GERD, HLD, and DM who presents to the Emergency Department for evaluation of L sided facial droop which onset suddenly 30-45 minutes PTA.  reports that their daughter is in labor and the sxs suddenly onset.  notes that she is receiving chemotherapy and radiation therapy for lung CA and that she is a smoker. Symptoms are constant and moderate in severity. No mitigating or exacerbating factors reported. Associated sxs include LUE weakness, LLE weakness, LUE numbness, and LLE numbness. Patient denies any fever, CP, SOB, abd pain, N/V, back pain, neck pain, HA, and all other sxs at this time. No prior Tx reported. No further complaints or concerns at this time.        Arrival mode: Personal vehicle     PCP: Roman Gonzalez MD        Past Medical History:  Past Medical History:   Diagnosis Date    Cancer 2018    Lung    Diabetes mellitus, type 2     Steriod induced, years ago, now glucose up and down    GERD (gastroesophageal reflux disease)     Hyperlipidemia     Sleep apnea        Past Surgical History:  Past Surgical History:   Procedure Laterality Date    BONE BIOPSY  2018    Sacral bone    BREAST BIOPSY Right 2014    BREAST LUMPECTOMY Right     BREAST LUMPECTOMY Right 2014    CARPAL TUNNEL RELEASE       SECTION       CHOLECYSTECTOMY      DILATION AND CURETTAGE OF UTERUS  06/1987    ENDOMETRIAL ABLATION  02/15/2011    HAND SURGERY Left 12/2007    HAND SURGERY Right 01/2008    HEMORRHOID SURGERY  09/03/2010    LUNG BIOPSY Right 03/28/2018    MOUTH SURGERY  1980 and 1982    cyst removal    SALIVARY GLAND SURGERY Left 04/16/2018    Biopsy    THROAT SURGERY  10/1983    Remove vocal cord nodule    TONSILLECTOMY  1969    TUNNELED VENOUS PORT PLACEMENT  04/18/2018         Family History:  History reviewed. No pertinent family history.    Social History:  Social History     Tobacco Use    Smoking status: Current Every Day Smoker     Packs/day: 0.50     Types: Cigarettes     Start date: 1980    Smokeless tobacco: Never Used   Substance and Sexual Activity    Alcohol use: Yes     Comment: Occassional    Drug use: No    Sexual activity: Yes     Partners: Male        Review of Systems     Review of Systems   Constitutional: Negative for fever.   HENT: Negative for sore throat.    Respiratory: Negative for shortness of breath.    Cardiovascular: Negative for chest pain.   Gastrointestinal: Negative for abdominal pain, nausea and vomiting.   Genitourinary: Negative for dysuria.   Musculoskeletal: Negative for back pain and neck pain.   Skin: Negative for rash.   Neurological: Positive for facial asymmetry (L sided facial droop), weakness (LUE, LLE) and numbness (LUE, LLE). Negative for headaches.   Hematological: Does not bruise/bleed easily.   All other systems reviewed and are negative.       Physical Exam     Initial Vitals [09/08/19 1253]   BP Pulse Resp Temp SpO2   (!) 141/68 (!) 121 20 99.2 °F (37.3 °C) 97 %      MAP       --          Physical Exam  Nursing Notes and Vital Signs Reviewed.  Constitutional: Patient is in mild distress. Well-developed and well-nourished.  Head: Atraumatic. Normocephalic.  Eyes: PERRL. EOM intact. Conjunctivae are not pale. No scleral icterus.  ENT: Mucous membranes are moist. Oropharynx  is clear and symmetric.    Neck: Supple. Full ROM. No lymphadenopathy.  Cardiovascular: Regular rate. Regular rhythm. No murmurs, rubs, or gallops. Distal pulses are 2+ and symmetric.  Pulmonary/Chest: No respiratory distress. Clear to auscultation bilaterally. No wheezing or rales.  Abdominal: Soft and non-distended.  There is no tenderness.  No rebound, guarding, or rigidity.  Musculoskeletal: Moves all extremities. No obvious deformities. No edema. No calf tenderness.  Skin: Warm and dry. No rash.  Neurological:  Patient has left facial droop with left lateral tongue deviation and slurred speech.  Strength is 1/5 in left arm to out of 5 in the left leg and normal in the right arm and leg.  Patient has decreased sensation to the left face arm and leg as well.  Unable to assess drift secondary to weakness in the arm  Psychiatric: Normal affect. Good eye contact. Appropriate in content.     ED Course   Critical Care  Date/Time: 9/8/2019 1:53 PM  Performed by: Tien Alberts Jr., MD  Authorized by: Tien Alberts Jr., MD   Direct patient critical care time: 20 minutes  Additional history critical care time: 15 minutes  Ordering / reviewing critical care time: 15 minutes  Documentation critical care time: 10 minutes  Consulting other physicians critical care time: 5 minutes  Consult with family critical care time: 5 minutes  Total critical care time (exclusive of procedural time) : 70 minutes  Critical care time was exclusive of separately billable procedures and treating other patients and teaching time.  Critical care was necessary to treat or prevent imminent or life-threatening deterioration of the following conditions: tpa.  Critical care was time spent personally by me on the following activities: blood draw for specimens, development of treatment plan with patient or surrogate, discussions with consultants, discussions with primary provider, interpretation of cardiac output measurements, evaluation of  "patient's response to treatment, examination of patient, obtaining history from patient or surrogate, ordering and performing treatments and interventions, ordering and review of laboratory studies, ordering and review of radiographic studies, pulse oximetry, re-evaluation of patient's condition and review of old charts.  Subsequent provider of critical care: I assumed direction of critical care for this patient from another provider of my specialty.        ED Vital Signs:  Vitals:    09/08/19 1253 09/08/19 1309 09/08/19 1310 09/08/19 1312   BP: (!) 141/68      Pulse: (!) 121  (!) 122 (!) 116   Resp: 20  20    Temp: 99.2 °F (37.3 °C)      TempSrc: Oral      SpO2: 97%  98%    Weight:  87.5 kg (192 lb 12.8 oz)     Height: 5' 3" (1.6 m)          Abnormal Lab Results:  Labs Reviewed   POCT GLUCOSE - Abnormal; Notable for the following components:       Result Value    POCT Glucose 155 (*)     All other components within normal limits   CBC W/ AUTO DIFFERENTIAL   COMPREHENSIVE METABOLIC PANEL   PROTIME-INR   TSH   LIPID PANEL   POCT GLUCOSE, HAND-HELD DEVICE   POCT GLUCOSE MONITORING CONTINUOUS        All Lab Results:  Results for orders placed or performed during the hospital encounter of 09/08/19   POCT glucose   Result Value Ref Range    POCT Glucose 155 (H) 70 - 110 mg/dL         Imaging Results:  Imaging Results          X-Ray Chest AP Portable (In process)                CT Head Without Contrast (Final result)  Result time 09/08/19 13:14:55    Final result by Aubree Perez MD (09/08/19 13:14:55)                 Impression:      No CT evidence of acute intracranial abnormality.    All CT scans at this facility use dose modulation, iterative reconstruction and/or weight based dosing when appropriate to reduce radiation dose to as low as reasonably achievable.      Electronically signed by: Aubree Perez  Date:    09/08/2019  Time:    13:14             Narrative:    EXAMINATION:  CT HEAD WITHOUT " CONTRAST    CLINICAL HISTORY:  Focal neuro deficit, new, fixed or worsening, <6 hours;    TECHNIQUE:  Axial CT imaging was performed through the head without intravenous contrast.    COMPARISON:  None    FINDINGS:  No hydrocephalus, midline shift, mass effect, or acute intracranial hemorrhage. Cortical sulcal pattern and gray-white matter differentiation is normal. Basilar cisterns and posterior fossa are normal. The visualized paranasal sinuses and mastoid air cells are clear. The skull is intact.                                 The EKG was ordered, reviewed, and independently interpreted by the ED provider.  Interpretation time: 1312  Rate: 116 BPM  Rhythm: Sinus tachycardia with frequent premature ventricular complexes.  Interpretation: Right axis deviation. Incomplete RBBB. Right ventricular hypertrophy. No STEMI.  The Emergency Provider reviewed the vital signs and test results, which are outlined above.     ED Discussion     1:34 PM  Patient has been evaluated by tele neuro.  Patient is a candidate for tPA at this time.  Patient has a history of lung cancer.  We have reviewed last MRI done on 08/19/2019 by [facility] on their [place] site.  There were no brain metastases noted on that MRI of the brain with and without contrast.  There was bony mets to the parietal calvarium but nothing in the brain itself.  The patient and family were consented for tPA.  There were discussed all risks and benefits to include the slightly increased risk of bleeding from an unknown brain Mets.  They are aware of the risk of death, brain hemorrhage, need for transfer, worsening symptoms etc.   did not recommend CT angio of the head neck here as she is being transferred after tPA in any event and they will obtain that in [place] to determine if thrombectomy is reasonable in this VAN positive patient.  He is arranging transfer at his and with [facility]    1:51 PM  Patient and  have given consent for  Ms. Carmelita Menon to be contacted regarding any medical questions.  She has been with the patient throughout her treatments for her cancer.    Carmelita Menon:  [phone]      ED Medication(s):  Medications   alteplase (ACTIVASE) 100 mg injection 71 mg (71 mg Intravenous New Bag 9/8/19 1349)   ALTEPLASE IV BOLUS bolus from vial 8 mg (8 mg Intravenous Bolus from Bag 9/8/19 1348)       New Prescriptions    No medications on file                 Medical Decision Making:   Clinical Tests:   Lab Tests: Ordered and Reviewed  Radiological Study: Ordered and Reviewed  Medical Tests: Ordered and Reviewed             Scribe Attestation:   Scribe #1: I performed the above scribed service and the documentation accurately describes the services I performed. I attest to the accuracy of the note.     Attending:   Physician Attestation Statement for Scribe #1: I, Tien Alberts Jr., MD, personally performed the services described in this documentation, as scribed by Jose Patrick, in my presence, and it is both accurate and complete.           Clinical Impression       ICD-10-CM ICD-9-CM   1. Cerebral infarction due to embolism of right middle cerebral artery I63.411 434.11   2. Stroke I63.9 434.91       Disposition:   Disposition: Transferred  Condition: Fair         Tien Alberts Jr., MD  09/08/19 1701

## 2019-09-08 NOTE — HPI
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 While in hospital in  about to enter her daughters C -section she became acutely plegic on left side with facial droop and dysarthria. NIHSS 16, VAN (+), GCS15. Discussion with family regarding her increased ICH risk with cancer and potential mets throughout body. Patient and family decided to receive tPA and [place] [facility] [place]

## 2019-09-08 NOTE — PLAN OF CARE
Problem: Adult Inpatient Plan of Care  Goal: Plan of Care Review  Outcome: Ongoing (interventions implemented as appropriate)  POC reviewed with pt and family at 1800. Pt and family both verbalized understanding. All questions and concerns addressed. Pt underwent thrombectomy for R MCA syndrome. Hemostasis achieved at 1644. [facility] in [facility] [place] [facility] at 1710. Pt oriented x4, moving all extremities spontaneously, and following commands upon arrival. Pt has slurred/dysarthric speech. HOB to remain flat until 1844. ANIKA to be performed once patient is able to raise HOB. Cardene gtt continued for SBP < 140. Pt progressing toward goals. Will continue to monitor. See flowsheets for full assessment and VS info.

## 2019-09-08 NOTE — CONSULTS
[facility]  Vascular Neurology  Comprehensive Stroke Center  Tele-Consultation Note      Consults    Consulting Provider: SAMEER LARIOS JR  Current Providers  No providers found    Patient Location:  [facility] EMERGENCY DEPARTMENT Emergency Department  Spoke hospital nurse at bedside with patient assisting consultant.     Patient information was obtained from patient, spouse/SO, relative(s), EMS personnel and ED staff.         Assessment/Plan:     STROKE DOCUMENTATION     Acute Stroke Times:   Acute Stroke Times   Last Known Normal Date: 09/08/19  Last Known Normal Time: 1230  Symptom Onset Date: 09/08/19  Symptom Onset Time: 1230  Stroke Team Called Date: 09/08/19  Stroke Team Called Time: 1302  Stroke Team Arrival Date: 09/08/19  Stroke Team Arrival Time: 1306  CT Interpretation Time: 1317  Decision to Treat Time for Alteplase: 1326    NIH Scale:  Interval: baseline  1a. Level of Consciousness: 0-->Alert, keenly responsive  1b. LOC Questions: 0-->Answers both questions correctly  1c. LOC Commands: 0-->Performs both tasks correctly  2. Best Gaze: 1-->Partial gaze palsy, gaze is abnormal in one or both eyes, but forced deviation or total gaze paresis is not present  3. Visual: 1-->Partial hemianopia  4. Facial Palsy: 2-->Partial paralysis (total or near-total paralysis of lower face)  5a. Motor Arm, Left: 4-->No movement  5b. Motor Arm, Right: 0-->No drift, limb holds 90 (or 45) degrees for full 10 secs  6a. Motor Leg, Left: 4-->No movement  6b. Motor Leg, Right: 1-->Drift, leg falls by the end of the 5-sec period but does not hit bed  7. Limb Ataxia: 0-->Absent  8. Sensory: 1-->Mild-to-moderate sensory loss, patient feels pinprick is less sharp or is dull on the affected side, or there is a loss of superficial pain with pinprick, but patient is aware of being touched  9. Best Language: 0-->No aphasia, normal  10. Dysarthria: 1-->Mild-to-moderate dysarthria, patient slurs at  least some words and, at worst, can be understood with some difficulty  11. 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 While in hospital in  about to enter her daughters C -section she became acutely plegic on left side with facial droop and dysarthria. NIHSS 16, VAN (+), GCS15. Discussion with family regarding her increased ICH risk with cancer and potential mets throughout body. Patient and family decided to receive tPA and [place] [facility] [place]    NIHSS 16  VAN (+)  within tPA window  Discussion with patient, family, and ED MD at bedside regarding increased risk of hemorrhage with tPA and cancer history. ED MD was able to obtain official read from MRI w/o at [place] from 8/19 that did show bony metastasis but no intraparenchymal lesions. We discussed that she is at higher risk than normal population for ICH and hemorrhage at other sites of body with her CA history. Patient and family decided to receive tPA treatment and [place] [facility] [place] higher level of care and potential IR.    Antithrombotics for secondary stroke prevention: Antiplatelets: Aspirin: 81 mg daily after 24 hour tPA window    Statins for secondary stroke prevention and hyperlipidemia, if present:   Statins: Atorvastatin- 40 mg daily    Aggressive risk factor modification: HTN, DM, HLD, Diet, Exercise     Rehab efforts: The patient has been evaluated by a stroke team provider and the therapy needs have been fully considered based off the presenting complaints and exam findings. The following therapy evaluations are needed: PT evaluate and treat, OT evaluate and  "treat, SLP evaluate and treat    Diagnostics ordered/pending: CT scan of head without contrast to asses brain parenchyma, CTA Head to assess vasculature , CTA Neck/Arch to assess vasculature, HgbA1C to assess blood glucose levels, Lipid Profile to assess cholesterol levels, MRI head without contrast to assess brain parenchyma, TTE to assess cardiac function/status , TSH to assess thyroid function    VTE prophylaxis: Heparin 5000 units SQ every 8 hours if patient bedrest after tPA window    BP parameters: Infarct: Post tPA, SBP <180    CTmph upon arrival        Blood pressure (!) 141/68, pulse (!) 116, temperature 99.2 °F (37.3 °C), temperature source Oral, resp. rate 20, height 5' 3" (1.6 m), weight 87.5 kg (192 lb 12.8 oz), SpO2 98 %.  Alteplase Eligible?: Yes  Alteplase Recommendation:   Alteplase Total Dose:   Total dose: Alteplase 0.9mg/kg (max dose:90mg)                      ** based on acquired weight from facility   Bolus Dose:   10% of total Alteplase dose given intravenously over 1 minute   Continuous Infusion Dose:   Remaining 90% of total Alteplase dose infused intravenously over 60 minutes    **infusion must start at the same time as the bolus dose     Additional Recommendations:   1. Neurological assessment and vital signs (except temperature) every 15 minutes during Altaplase infusion.  2. Frequency of BP assessments may need to be increased if systolic BP stays >= 180 mm Hg or diastolic BP stays >= 105 mm Hg. Administer antihypertensive meds as ordered  3. Continue to monitor and control blood pressure and monitor for neurological deterioration every 15 minutes for the first hour after the infusion is stopped. Then every 30 minutes for the next 6 hours. Perform hourly monitoring from the 8th post-infusion hour until 24 hours post-infusion.  4. Temperature every 4 hours or as required.  5. Follow hospital protocol for further orders re: post tPA infusion patient management.  6. No antithrombotics or " anticoagulants (including but not limited to: heparin, warfarin, aspirin, clopidigrel, or dipyridamole) for 24 hours, then start antithrombotics as ordered by treating physician    Adapted from the American Heart Association/American Stroke Association (AHA/ASA) and American Association of Neuroscience Nurses (AANN) Guidelines.   Possible Interventional Revascularization Candidate? 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 While in hospital in [place] about to enter her daughters C -section she became acutely plegic on left side with facial droop and dysarthria. NIHSS 16, VAN (+), GCS15. Discussion with family regarding her increased ICH risk with cancer and potential mets throughout body. Patient and family decided to receive tPA and [place] [facility] [place] [facility] [place]      Woke up with symptoms?: no    Recent bleeding noted: no  Does the patient take any Blood Thinners? no  Medications: No relevant medications      Past Medical History: hypertension, diabetes, hyperlipidemia, stroke and Cancer    Past Surgical History: no major surgeries within the last 2 weeks    Family History: hypertension, diabetes, hyperlipidemia, CHF and stroke    Social History: no smoking, no drinking, no drugs    Allergies: Rocephin [Ceftriaxone]  Augmentin [Amoxicillin-Pot Clavulanate] see above    Review of Systems   Review of Symptoms:   Constitutional: Denies fevers or chills.  ENT: no hearing difficulty, no visual changes  Pulmonary: Denies shortness of breath or cough.  Cardiology: Denies chest pain or palpitations.  GI: Denies abdominal pain or constipation.  Neurologic: left sided weakness and slurred speech  : no dysuria  Musk: no muscle pain, no joint pain  Psych: no hallucinations      Objective:   Vitals: Blood pressure (!)  "141/68, pulse (!) 116, temperature 99.2 °F (37.3 °C), temperature source Oral, resp. rate 20, height 5' 3" (1.6 m), weight 87.5 kg (192 lb 12.8 oz), SpO2 98 %. Height: see above    CT READ: Yes  No hemmorhage. No mass effect. No early infarct signs.     Physical Exam  Physical Exam:  GA: Alert, comfortable, no acute distress.   Pulmonary: normal chest rise  Abdominal: no distension appreciated  Skin: no visible lesions on exposed skin      Neuro:    --GCS: 15  --Mental Status:  AOx4  --CN II-XII grossly intact. Other than left facial droop    --EOMI other than right gaze preference  --brainstem: intact  --Motor: left side plegic, drift on right leg  --sensory: no sensation to left side  --Reflexes: not tested  --Gait: deferred  Visual and sensory neglect to left side            Recommended the emergency room physician to have a brief discussion with the patient and/or family if available regarding the risks and benefits of treatment, and to briefly document the occurrence of that discussion in his clinical encounter note.     The attending portion of this evaluation, treatment, and documentation was performed per Niko Willams MD via audiovisual.    Billing code:  (moderate to severe stroke, large areas of edema, some mimics)    · This patient has a critical neurological condition/illness, with high morbidity and mortality.  · There is a high probability for acute neurological change leading to clinical and possibly life-threatening deterioration requiring highest level of physician preparedness for urgent intervention.  · Care was coordinated with other physicians involved in the patient's care.  · Radiologic studies and laboratory data were reviewed and interpreted, and plan of care was re-assessed based on the results.  · Diagnosis, treatment options and prognosis may have been discussed with the patient and/or family members or caregiver.  · Further advanced medical management and further evaluation is " warranted for his care.      In your opinion, this was a: Tier 1 Van Positive    Consult End Time: 1:43 PM     Niko Willams MD  Comprehensive Stroke Center  Vascular Neurology   [facility]

## 2019-09-08 NOTE — PROGRESS NOTES
Hemostasis achieved via right groin with use of perc- closure device. HOB to remain flat until 1844

## 2019-09-08 NOTE — ED NOTES
reports pt was with daughter in hospital getting ready to go into a  when she lost feeling in her L arm with L sided facial droop and slurring of speech. Approx onset at 1230

## 2019-09-08 NOTE — H&P
Inpatient Radiology Pre-procedure Note    History of Present Illness:  Beatriz Valente is a 53 y.o. female who presents for cerebral angiogram and possible mechanical thrombectomy for right MCA syndrome.  Admission H&P reviewed.  Past Medical History:   Diagnosis Date    Cancer 2018    Lung    Diabetes mellitus, type 2     Steriod induced, years ago, now glucose up and down    GERD (gastroesophageal reflux disease)     Hyperlipidemia     Sleep apnea      Past Surgical History:   Procedure Laterality Date    BONE BIOPSY  2018    Sacral bone    BREAST BIOPSY Right 2014    BREAST LUMPECTOMY Right     BREAST LUMPECTOMY Right 2014    CARPAL TUNNEL RELEASE       SECTION      CHOLECYSTECTOMY      DILATION AND CURETTAGE OF UTERUS  1987    ENDOMETRIAL ABLATION  02/15/2011    HAND SURGERY Left 2007    HAND SURGERY Right 2008    HEMORRHOID SURGERY  2010    LUNG BIOPSY Right 2018    MOUTH SURGERY   and     cyst removal    SALIVARY GLAND SURGERY Left 2018    Biopsy    THROAT SURGERY  10/1983    Remove vocal cord nodule    TONSILLECTOMY  1969    TUNNELED VENOUS PORT PLACEMENT  2018       Review of Systems:   As documented in primary team H&P    Home Meds:   Prior to Admission medications    Medication Sig Start Date End Date Taking? Authorizing Provider   albuterol (PROAIR HFA) 90 mcg/actuation inhaler Inhale 2 puffs into the lungs every 6 (six) hours as needed for Wheezing. Rescue    Historical Provider, MD   diphenhydrAMINE (BENADRYL) 25 mg capsule Take 1 each (25 mg total) by mouth every 6 (six) hours as needed for Itching or Allergies. 17   Josr Garcia Jr., MD   docusate sodium (COLACE) 100 MG capsule Take 100 mg by mouth 2 (two) times daily.    Historical Provider, MD   famotidine (PEPCID) 20 MG tablet Take 1 tablet (20 mg total) by mouth 2 (two) times daily. 17  Josr Garcia Jr., MD   fentaNYL (DURAGESIC) 25  mcg/hr Place 1 patch onto the skin every 72 hours.    Historical Provider, MD   flaxseed oil 1,000 mg Cap Take 1 capsule by mouth once daily.    Historical Provider, MD   fluticasone (FLONASE) 50 mcg/actuation nasal spray 1 spray by Each Nare route daily as needed for Rhinitis.    Historical Provider, MD   levothyroxine (SYNTHROID) 25 MCG tablet Take 25 mcg by mouth once daily.    Historical Provider, MD   milk thistle 175 mg tablet Take 175 mg by mouth once daily.    Historical Provider, MD   modafinil (PROVIGIL) 200 MG Tab Take 200 mg by mouth once daily.    Historical Provider, MD   multivitamin capsule Take 1 capsule by mouth once daily.    Historical Provider, MD   omega 3-dha-epa-fish oil (FISH OIL) 1,000 mg (120 mg-180 mg) Cap Take 1 capsule by mouth once daily.    Historical Provider, MD   ondansetron (ZOFRAN) 8 MG tablet Take 8 mg by mouth every 8 (eight) hours.    Historical Provider, MD   oxyCODONE-acetaminophen (PERCOCET)  mg per tablet Take 1 tablet by mouth 2 (two) times daily.    Historical Provider, MD   pantoprazole (PROTONIX) 40 MG tablet Take 40 mg by mouth once daily.    Historical Provider, MD   aspirin (ECOTRIN) 81 MG EC tablet Take 81 mg by mouth once daily.  9/8/19  Historical Provider, MD   calcium-vitamin D3 (CALCIUM 500 + D) 500 mg(1,250mg) -200 unit per tablet Take 1 tablet by mouth 2 (two) times daily with meals.  9/8/19  Historical Provider, MD   dicyclomine (BENTYL) 10 MG capsule Take 10 mg by mouth 4 (four) times daily before meals and nightly.  9/8/19  Historical Provider, MD   ipratropium (ATROVENT) 0.03 % nasal spray 2 sprays by Nasal route 3 (three) times daily. 11/14/18 9/8/19  Roman Gonzalez MD     Scheduled Meds:   Continuous Infusions:   PRN Meds:  Anticoagulants/Antiplatelets: no anticoagulation    Allergies:   Review of patient's allergies indicates:   Allergen Reactions    Rocephin [ceftriaxone] Shortness Of Breath    Augmentin [amoxicillin-pot clavulanate] Rash      Sedation Hx: have not been any systemic reactions    Labs:  Recent Labs   Lab 09/08/19  1313   INR 0.9       Recent Labs   Lab 09/08/19  1313   WBC 1.72*   HGB 10.7*   HCT 33.1*   *   PLT 40*      Recent Labs   Lab 09/08/19  1313   *      K 4.0      CO2 23   BUN 15   CREATININE 1.0   CALCIUM 9.8   ALT 19   AST 31   ALBUMIN 3.3*   BILITOT 0.4         Vitals:  Pulse: 108 (09/08/19 1533)  Resp: 18 (09/08/19 1533)  BP: (!) 139/91 (09/08/19 1533)  SpO2: 97 % (09/08/19 1533)     Physical Exam:  ASA: 3  Mallampati: 2      HEENT: normocephalic, atraumatic  Chest: unlabored breathing  Heart: regular heart rate  Abdomen: nondistended  Extremity: left sided weakness.  Neuro: NIH 15, Please see vascular neurology H & P for full neuro exam.    Plan: Cerebral angiogram with possible mechanical thrombectomy for right M2 occlusion. LKN 12:30. Informed consent from family.   Sedation Plan: Local and light sedation.    Ruddy Daniels MD  Department of Radiology  Pager: [phone]

## 2019-09-08 NOTE — ED PROVIDER NOTES
Encounter Date: 2019       History     Chief Complaint   Patient presents with    Cerebrovascular Accident     patient arrives as transfer from [facility] for CVA with TPA given at 1348 - patient had sudden onset of left sided facial droop and left sided weakness with aphasia at 1300 today      53-year-old female who is transferred from another hospital where apparently if she presented to the ED with left-sided facial weakness and left-sided both upper and lower extremity weakness apparently at approximately 12:30 p.m. the symptoms began she was with her daughter who was in labor and began having the complaints of left facial droop she was taken down to the emergency room with that hospital and she was treated for a probable stroke CT was performed which showed no obvious bleed or obvious findings there was a tele stroke was obtained and it was decided that the patient should receive a tPA.  Patient does have history of lung CA diabetes and hypertension patient is transferred here for further evaluation        Review of patient's allergies indicates:   Allergen Reactions    Rocephin [ceftriaxone] Shortness Of Breath    Augmentin [amoxicillin-pot clavulanate] Rash     Past Medical History:   Diagnosis Date    Cancer 2018    Lung    Diabetes mellitus, type 2     Steriod induced, years ago, now glucose up and down    GERD (gastroesophageal reflux disease)     Hyperlipidemia     Sleep apnea      Past Surgical History:   Procedure Laterality Date    BONE BIOPSY  2018    Sacral bone    BREAST BIOPSY Right 2014    BREAST LUMPECTOMY Right     BREAST LUMPECTOMY Right 2014    CARPAL TUNNEL RELEASE       SECTION      CHOLECYSTECTOMY      DILATION AND CURETTAGE OF UTERUS  1987    ENDOMETRIAL ABLATION  02/15/2011    HAND SURGERY Left 2007    HAND SURGERY Right 2008    HEMORRHOID SURGERY  2010    LUNG BIOPSY Right 2018    MOUTH SURGERY   and      cyst removal    SALIVARY GLAND SURGERY Left 04/16/2018    Biopsy    THROAT SURGERY  10/1983    Remove vocal cord nodule    TONSILLECTOMY  1969    TUNNELED VENOUS PORT PLACEMENT  04/18/2018     History reviewed. No pertinent family history.  Social History     Tobacco Use    Smoking status: Current Every Day Smoker     Packs/day: 0.50     Types: Cigarettes     Start date: 1980    Smokeless tobacco: Never Used   Substance Use Topics    Alcohol use: Yes     Comment: Occassional    Drug use: No     Review of Systems   Unable to perform ROS: Patient nonverbal       Physical Exam     Initial Vitals [09/08/19 1403]   BP Pulse Resp Temp SpO2   (!) 138/98 (!) 117 16 -- 100 %      MAP       --         Physical Exam    Constitutional: She appears ill.   Awake    HENT:   Head: Normocephalic and atraumatic.   Eyes:   Will not look to the left   Neck: Normal range of motion. Neck supple.   Cardiovascular: Normal rate, regular rhythm and normal heart sounds.   Pulmonary/Chest: Breath sounds normal. No accessory muscle usage. No tachypnea. No respiratory distress.   Abdominal: She exhibits no distension. There is no tenderness.   Neurological: A cranial nerve deficit is present.   Left extremities paralysis   Skin: Skin is warm and dry.         ED Course   Procedures  Labs Reviewed - No data to display       Imaging Results          CTA STROKE MULTI-PHASE (In process)  Result time 09/08/19 15:15:16                 Medical Decision Making:   History:   Old Records Summarized: records from another hospital.  Clinical Tests:   Radiological Study: Ordered and Reviewed              Attending Attestation:             Attending ED Notes:   Patient transferred from another hospital after she had presented there with left facial paralysis left upper extremity and lower extremity paralysis patient had a tele stroke done and the patient was treated with tPA and transferred here for further evaluation upon arrival here patient was  taken to the CAT scan and a CTA was performed and they it was felt that the patient had a treatable obstruction of her right MCA and the patient was taken up to the interventional lab and following this will go to Neuro Critical Care for further care and evaluation             Clinical Impression:       ICD-10-CM ICD-9-CM   1. CVA (cerebral vascular accident) I63.9 434.91   2. Evaluation by medical service required Z00.00 V82.89   3. Evaluation by medical service required Z00.00 V82.89         Disposition:   Disposition: Admitted  Condition: Critical                        Mingo Paz MD  09/08/19 8161

## 2019-09-08 NOTE — ASSESSMENT & PLAN NOTE
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 While in hospital in  about to enter her daughters C -section she became acutely plegic on left side with facial droop and dysarthria. NIHSS 16, VAN (+), GCS15. Discussion with family regarding her increased ICH risk with cancer and potential mets throughout body. Patient and family decided to receive tPA and transfer to [facility] for potential IR.    NIHSS 16  VAN (+)  within tPA window  Discussion with patient, family, and ED MD at bedside regarding increased risk of hemorrhage with tPA and cancer history. ED MD was able to obtain official read from MRI w/o at [facility] from 8/19 that did show bony metastasis but no intraparenchymal lesions. We discussed that she is at higher risk than normal population for ICH and hemorrhage at other sites of body with her CA history. Patient and family decided to receive tPA treatment and transfer to [facility] for higher level of care and potential IR.    Antithrombotics for secondary stroke prevention: Antiplatelets: Aspirin: 81 mg daily after 24 hour tPA window    Statins for secondary stroke prevention and hyperlipidemia, if present:   Statins: Atorvastatin- 40 mg daily    Aggressive risk factor modification: HTN, DM, HLD, Diet, Exercise     Rehab efforts: The patient has been evaluated by a stroke team provider and the therapy needs have been fully considered based off the presenting complaints and exam findings. The following therapy evaluations are needed: PT evaluate and treat, OT evaluate and treat, SLP evaluate and treat    Diagnostics ordered/pending: CT scan of head without contrast to asses brain parenchyma, CTA Head to assess vasculature , CTA Neck/Arch to assess vasculature, HgbA1C to assess blood glucose levels, Lipid Profile to assess cholesterol levels, MRI head without contrast to assess brain parenchyma, TTE to assess cardiac function/status , TSH  to assess thyroid function    VTE prophylaxis: Heparin 5000 units SQ every 8 hours if patient bedrest after tPA window    BP parameters: Infarct: Post tPA, SBP <180    CTmph upon arrival

## 2019-09-08 NOTE — PROCEDURES
Radiology Post-Procedure Note    Pre Op Diagnosis: Right MCA stroke    Post Op Diagnosis: same    Procedure: Cerebral angiogram and mechanical thrombectomy    Procedure performed by: Ruddy Daniels MD    Written Informed Consent Obtained: Yes    Specimen Removed: NO    Estimated Blood Loss: less than 50     Procedure report:     An 8F sheath was placed into the right femoral artery and a 5F Jim catheter was advanced into the aortic arch.  The right common and internal carotid arteries were subselected and angiography of the brain was performed after injection into each of these vessels.    Preliminary interpretation: Right M2 occlusion. Mechanical thrombectomy performed. 2 passes with TICI 3 flow. Please see Imaging report for full details.    A right femoral artery angiogram was performed, the sheath removed and hemostasis achieved using a perclose device.  No hematoma was present at the time of hemostasis.    Blood pressure goals of SBP < 140.    The patient tolerated the procedure well.     Ruddy Daniels MD  Department of Radiology  Pager: [phone]

## 2019-09-08 NOTE — ASSESSMENT & PLAN NOTE
53 pt was in [facility] when she presented with acute LSW, L facial droop and slurred speech. Pt was given tPA 1326 and transferred to [facility] for IR and intervention.     -admitted to [facility]   - neurology following   -IR following, pt taken for IR procedure for reperfusion of R M2 occlusion   -neuro checks q 1hr   -vital checsk q 1hr   -SBP <140   -CT stroke Multiphase - abrupt occlusion of RMCA, stenosis of distal R Vertebral artery, Partially imaged R suprahilar apical lung mass correlating with pt history of lung CA   -repeat imaging MRI 9/9   -tPA end time 1326, repeat imaging 18-24 hrs   -CBC,CMP, TSH, HgA1c, Lipid Panel Pending   -EKG, Echo pending   -PT/OT when appropriate

## 2019-09-08 NOTE — ASSESSMENT & PLAN NOTE
PLTs 40   Given tPA 1326  Concern hypercoagulable state 2/2 to SCLC w/ mets   Trend CBC with diff

## 2019-09-08 NOTE — ED TRIAGE NOTES
Patient is a 52 yo female transferred from [facility] for neuro eval. Patient was LSN around 1300 and was going into see her grandson be born by  when she noticed slurred speech and that her L arm was not moving. Went to ED at [facility] and received TPA - 7 mg bolus started at 1348 and 71 mg infusion started at 1349. TPA finished on arrival - symptoms have no resolved. Stroke team, multiple RNs, and Dr. Ramachandran-Jennifer at bedside on patient arrival.

## 2019-09-08 NOTE — NURSING
Patient arrived to [facility] from ED -> IR by [facility]    Type of stroke/diagnosis: R MCA TICI 3    TPA start and end time (5715-7251)    Thrombectomy start and end time (3815-2741)    Current symptoms: L droop, slur speech, L wk    Skin assessment done: Y  Wounds noted: angio site R groin    NCC notified: ESTELLE Handley

## 2019-09-08 NOTE — CONSULTS
[facility]  Vascular Neurology  Comprehensive Stroke Center  Consult Note    Inpatient consult to Vascular (Stroke) Neurology  Consult performed by: Harry Del Real MD  Consult ordered by: Mingo Paz MD        Assessment/Plan:     Patient is a 53 y.o. year old female with:    * Acute ischemic right MCA stroke  53 y.o woman with a medical history of small cell lung carcinoma (currently receiving chemo and radiation with bony metastasis), HTN, DM2, HLD, current every day smoker and BASSAM presented to [facility] with sudden onset of facial droop and left sided plegia, NIHSS 16, VAN (+), GCS 15. Decision was made to give tPA at  and transfer to [facility] for thrombectomy. Upon arrival CTA multiphase showed right M2 occlusion. Decision was made to do thrombectomy. Will be admitted to [facility] post thrombectomy. Possible etiology embolic 2/2 her current risk factors as well as hypercoagulable from her cancer.      Antithrombotics for secondary stroke prevention: Antiplatelets: None: Hold all Antithrombotics x 24 hours after IV t-PA administration    Statins for secondary stroke prevention and hyperlipidemia, if present:   Statins: Atorvastatin- 80 mg daily    Aggressive risk factor modification: HTN, Smoking, DM, HLD, lung cancer     Rehab efforts: The patient has been evaluated by a stroke team provider and the therapy needs have been fully considered based off the presenting complaints and exam findings. The following therapy evaluations are needed: PT evaluate and treat, OT evaluate and treat, SLP evaluate and treat, PM&R evaluate for appropriate placement    Diagnostics ordered/pending: TTE to assess cardiac function/status     VTE prophylaxis: None: Reason for No Pharmacological VTE Prophylaxis: Mechanical prophylaxis: Place SCDs- hold VTE prophylaxis 24 hours post tpa     BP parameters: Infarct: Post sucessful thrombectomy, SBP <140        Diabetes mellitus type II, controlled  Unclear which  medication patient takes for diabetes. In the hospital place patient on sliding scale.     HLD (hyperlipidemia)  Atorvastatin 80 mg    Lung cancer metastatic to bone  Currently on chemotherapy and radiation    Tobacco abuse  Current every day smoker. Nicotine patch         STROKE DOCUMENTATION     Acute Stroke Times   Last Known Normal Date: 09/08/19  Last Known Normal Time: 1230  Symptom Onset Date: 09/08/19  Symptom Onset Time: 1230  Stroke Team Called Date: 09/08/19  Stroke Team Called Time: 1500  Stroke Team Arrival Date: 09/08/19  Stroke Team Arrival Time: 1500  CT Interpretation Time: 1217  Decision to Treat Time for Alteplase: 1326    NIH Scale:  1a. Level of Consciousness: 0-->Alert, keenly responsive  1b. LOC Questions: 0-->Answers both questions correctly  1c. LOC Commands: 0-->Performs both tasks correctly  2. Best Gaze: 1-->Partial gaze palsy, gaze is abnormal in one or both eyes, but forced deviation or total gaze paresis is not present  3. Visual: 1-->Partial hemianopia  4. Facial Palsy: 2-->Partial paralysis (total or near-total paralysis of lower face)  5a. Motor Arm, Left: 4-->No movement  5b. Motor Arm, Right: 0-->No drift, limb holds 90 (or 45) degrees for full 10 secs  6a. Motor Leg, Left: 4-->No movement  6b. Motor Leg, Right: 0-->No drift, leg holds 30 degree position for full 5 secs  7. Limb Ataxia: 0-->Absent  8. Sensory: 2-->Severe to total sensory loss, patient is not aware of being touched in the face, arm, and leg  9. Best Language: 1-->Mild-to-moderate aphasia, some obvious loss of fluency or facility of comprehension, without significant limitation on ideas expressed or form of expression. Reduction of speech and/or comprehension, however, makes conversation. . . (see row details)  10. Dysarthria: 1-->Mild-to-moderate dysarthria, patient slurs at least some words and, at worst, can be understood with some difficulty  11. Extinction and Inattention (formerly Neglect): 0-->No  abnormality  Total (NIH Stroke Scale): 16    Modified Bingham    Holden Coma Scale:    ABCD2 Score:    [id number] Score:   HAS -BLED Score:   ICH Score:   Hunt & Dewey Classification:       Thrombolysis Candidate? Yes. The risks and benefits of tPA were discussed with the patient and/or family. The patient and/or family verbalized understanding of the risks arid benefits and has given verbal consent for tPA, If patient was not competent or no family was available, treatment will be administered as an emergency procedure and in what we believe to be the patients best interest    Delays to Thrombolysis?  No    Interventional Revascularization Candidate?   Is the patient eligible for mechanical endovascular reperfusion (PEDRO)?  Yes      Hemorrhagic change of an Ischemic Stroke: Does this patient have an ischemic stroke with hemorrhagic changes? No     Subjective:     History of Present Illness:  54 y/o woman with a medical history of small cell lung carcinoma (with possible metastasis to the brain receiving chemo and radiation), GERD, HLD and DM was brought into the ER at [facility] for sudden onset of left sided facial droop with left sided hemiplegia when she was visiting her daughter who was in labor. Last known normal is 12:30 PM today.  Patient denied any chest pain, SOB, neck pain or headache.    During telestroke conference NIHSS was 15, VAN +, and patient was within the tPA window. After extensive conversation with the family regarding increased ICH risk with cancer and potential metastasis patient and family was transferred here for potential IR intervention. Upon arrival stroke NIHSS still 16. CTA multiphase done showed right M2 occlusion. Decision was made to do thrombectomy.           Past Medical History:   Diagnosis Date    Cancer 03/28/2018    Lung    Diabetes mellitus, type 2     Steriod induced, years ago, now glucose up and down    GERD (gastroesophageal reflux disease)     Hyperlipidemia      Sleep apnea      Past Surgical History:   Procedure Laterality Date    BONE BIOPSY  2018    Sacral bone    BREAST BIOPSY Right 2014    BREAST LUMPECTOMY Right     BREAST LUMPECTOMY Right 2014    CARPAL TUNNEL RELEASE       SECTION      CHOLECYSTECTOMY      DILATION AND CURETTAGE OF UTERUS  1987    ENDOMETRIAL ABLATION  02/15/2011    HAND SURGERY Left 2007    HAND SURGERY Right 2008    HEMORRHOID SURGERY  2010    LUNG BIOPSY Right 2018    MOUTH SURGERY  1980 and 1982    cyst removal    SALIVARY GLAND SURGERY Left 2018    Biopsy    THROAT SURGERY  10/1983    Remove vocal cord nodule    TONSILLECTOMY  1969    TUNNELED VENOUS PORT PLACEMENT  2018     History reviewed. No pertinent family history.  Social History     Tobacco Use    Smoking status: Current Every Day Smoker     Packs/day: 0.50     Types: Cigarettes     Start date:     Smokeless tobacco: Never Used   Substance Use Topics    Alcohol use: Yes     Comment: Occassional    Drug use: No     Review of patient's allergies indicates:   Allergen Reactions    Rocephin [ceftriaxone] Shortness Of Breath    Augmentin [amoxicillin-pot clavulanate] Rash       Medications: I have reviewed the current medication administration record.      (Not in a hospital admission)    Review of Systems   Constitutional: Negative for chills, diaphoresis, fatigue and fever.   Cardiovascular: Negative for chest pain, palpitations and leg swelling.   Musculoskeletal: Positive for gait problem. Negative for arthralgias, back pain, joint swelling, myalgias, neck pain and neck stiffness.   Skin: Negative for color change, pallor and rash.   Neurological: Positive for facial asymmetry, speech difficulty and weakness. Negative for dizziness, tremors, seizures, syncope, light-headedness, numbness and headaches.     Objective:     Vital Signs (Most Recent):  Pulse: 99 (19 1651)  Resp: 20 (19  1651)  BP: 136/85 (09/08/19 1651)  SpO2: 100 % (09/08/19 1651)    Vital Signs Range (Last 24H):  Temp:  [98.5 °F (36.9 °C)-99.2 °F (37.3 °C)]   Pulse:  []   Resp:  [10-22]   BP: (126-171)/(59-98)   SpO2:  [94 %-100 %]     Physical Exam   Constitutional: She is oriented to person, place, and time. She appears well-developed and well-nourished.   Neurological: She is alert and oriented to person, place, and time. A cranial nerve deficit and sensory deficit is present. She exhibits abnormal muscle tone. Coordination abnormal.   Skin: She is not diaphoretic.       Neurological Exam:   LOC: alert  Attention Span: Good   Language: Expressive aphasia  Articulation: Dysarthria  Orientation: Person, Place, Time   Visual Fields: Visual neglect  EOM (CN III, IV, VI): Horizontal gaze paralysis   Pupils (CN II, III): PERRL  Facial Sensation (CN V): Facial sensory loss  Facial Movement (CN VII): Upper & lower facial weakness on the Left  Reflexes: 2+ throughout  Motor: Arm left  Plegia 0/5  Leg left  Plegia 0/5  Arm right  Paresis: 4/5  Leg right Paresis: 4/5  Cebellar: No evidence of appendicular or axial ataxia  Sensation: Enmanuel-hypoesthesia left  Tone: Flaccid  LUE  and LLE      Laboratory:  CMP:   Recent Labs   Lab 09/08/19  1313   CALCIUM 9.8   ALBUMIN 3.3*   PROT 7.6      K 4.0   CO2 23      BUN 15   CREATININE 1.0   ALKPHOS 113   ALT 19   AST 31   BILITOT 0.4     CBC:   Recent Labs   Lab 09/08/19  1313   WBC 1.72*   RBC 3.20*   HGB 10.7*   HCT 33.1*   PLT 40*   *   MCH 33.4*   MCHC 32.3     Lipid Panel: No results for input(s): CHOL, LDLCALC, HDL, TRIG in the last 168 hours.  Coagulation:   Recent Labs   Lab 09/08/19  1313   INR 0.9     Hgb A1C: No results for input(s): HGBA1C in the last 168 hours.  TSH:   Recent Labs   Lab 09/08/19  1313   TSH 2.400       Diagnostic Results:      Brain imaging:      No CT evidence of acute intracranial abnormality.    Vessel Imaging:        Abrupt occlusion of the  lateral M2 segment of the right MCA.  No significant parenchymal hypoattenuation, edema/ mass effect, or hemorrhage.    Cardiac Evaluation:   TTE pending       Harry Del Real MD  Comprehensive Stroke Center  Department of Vascular Neurology   [facility]

## 2019-09-08 NOTE — SUBJECTIVE & OBJECTIVE
"  Woke up with symptoms?: no    Recent bleeding noted: no  Does the patient take any Blood Thinners? no  Medications: No relevant medications      Past Medical History: hypertension, diabetes, hyperlipidemia, stroke and Cancer    Past Surgical History: no major surgeries within the last 2 weeks    Family History: hypertension, diabetes, hyperlipidemia, CHF and stroke    Social History: no smoking, no drinking, no drugs    Allergies: Rocephin [Ceftriaxone]  Augmentin [Amoxicillin-Pot Clavulanate] see above    Review of Systems   Review of Symptoms:   Constitutional: Denies fevers or chills.  ENT: no hearing difficulty, no visual changes  Pulmonary: Denies shortness of breath or cough.  Cardiology: Denies chest pain or palpitations.  GI: Denies abdominal pain or constipation.  Neurologic: left sided weakness and slurred speech  : no dysuria  Musk: no muscle pain, no joint pain  Psych: no hallucinations      Objective:   Vitals: Blood pressure (!) 141/68, pulse (!) 116, temperature 99.2 °F (37.3 °C), temperature source Oral, resp. rate 20, height 5' 3" (1.6 m), weight 87.5 kg (192 lb 12.8 oz), SpO2 98 %. Height: see above    CT READ: Yes  No hemmorhage. No mass effect. No early infarct signs.     Physical Exam  Physical Exam:  GA: Alert, comfortable, no acute distress.   Pulmonary: normal chest rise  Abdominal: no distension appreciated  Skin: no visible lesions on exposed skin      Neuro:    --GCS: 15  --Mental Status:  AOx4  --CN II-XII grossly intact. Other than left facial droop    --EOMI other than right gaze preference  --brainstem: intact  --Motor: left side plegic, drift on right leg  --sensory: no sensation to left side  --Reflexes: not tested  --Gait: deferred  Visual and sensory neglect to left side        "

## 2019-09-08 NOTE — PLAN OF CARE
Patient transferred to IR room 190 for cerebral angiogram with thrombectomy. Patient ate lunch at 1130

## 2019-09-08 NOTE — HOSPITAL COURSE
09/08/2019 tPA 1326 OSH, transfer to [facility], pt taken for IR, admit to [facility]   09/11/2019: PLTs at 31, went up to 37 after 1 U PLT. Patient is otherwise stable on exam and able to step down.

## 2019-09-09 PROBLEM — I63.411 EMBOLIC STROKE INVOLVING RIGHT MIDDLE CEREBRAL ARTERY: Status: ACTIVE | Noted: 2019-09-09

## 2019-09-09 PROBLEM — C34.90 SMALL CELL LUNG CANCER IN ADULT: Status: ACTIVE | Noted: 2019-09-09

## 2019-09-09 PROBLEM — G93.6 CYTOTOXIC CEREBRAL EDEMA: Status: ACTIVE | Noted: 2019-09-09

## 2019-09-09 PROBLEM — Z99.89 CPAP (CONTINUOUS POSITIVE AIRWAY PRESSURE) DEPENDENCE: Status: ACTIVE | Noted: 2019-09-09

## 2019-09-09 PROBLEM — G93.5 BRAIN COMPRESSION: Status: ACTIVE | Noted: 2019-09-09

## 2019-09-09 LAB
ALBUMIN SERPL BCP-MCNC: 3.1 G/DL (ref 3.5–5.2)
ALP SERPL-CCNC: 107 U/L (ref 55–135)
ALT SERPL W/O P-5'-P-CCNC: 16 U/L (ref 10–44)
ANION GAP SERPL CALC-SCNC: 8 MMOL/L (ref 8–16)
ANISOCYTOSIS BLD QL SMEAR: SLIGHT
ANISOCYTOSIS BLD QL SMEAR: SLIGHT
AST SERPL-CCNC: 19 U/L (ref 10–40)
AV INDEX (PROSTH): 0.89
AV MEAN GRADIENT: 3 MMHG
AV VALVE AREA: 3.09 CM2
BACTERIA #/AREA URNS AUTO: NORMAL /HPF
BASO STIPL BLD QL SMEAR: ABNORMAL
BASOPHILS # BLD AUTO: 0.01 K/UL (ref 0–0.2)
BASOPHILS # BLD AUTO: 0.01 K/UL (ref 0–0.2)
BASOPHILS NFR BLD: 0.6 % (ref 0–1.9)
BASOPHILS NFR BLD: 0.6 % (ref 0–1.9)
BILIRUB SERPL-MCNC: 0.3 MG/DL (ref 0.1–1)
BILIRUB UR QL STRIP: NEGATIVE
BSA FOR ECHO PROCEDURE: 1.97 M2
BUN SERPL-MCNC: 12 MG/DL (ref 6–20)
CALCIUM SERPL-MCNC: 9 MG/DL (ref 8.7–10.5)
CHLORIDE SERPL-SCNC: 109 MMOL/L (ref 95–110)
CLARITY UR REFRACT.AUTO: CLEAR
CO2 SERPL-SCNC: 25 MMOL/L (ref 23–29)
COLOR UR AUTO: YELLOW
CREAT SERPL-MCNC: 0.7 MG/DL (ref 0.5–1.4)
CV ECHO LV RWT: 0.46 CM
DACRYOCYTES BLD QL SMEAR: ABNORMAL
DIFFERENTIAL METHOD: ABNORMAL
DIFFERENTIAL METHOD: ABNORMAL
DOP CALC AO VTI: 27.85 CM
DOP CALC LVOT AREA: 3.5 CM2
DOP CALC LVOT DIAMETER: 2.1 CM
DOP CALC LVOT PEAK VEL: 1.17 M/S
DOP CALC LVOT STROKE VOLUME: 86.06 CM3
DOP CALCLVOT PEAK VEL VTI: 24.86 CM
E WAVE DECELERATION TIME: 260.86 MSEC
E/A RATIO: 1.28
E/E' RATIO: 9.14 M/S
ECHO LV POSTERIOR WALL: 0.97 CM (ref 0.6–1.1)
EOSINOPHIL # BLD AUTO: 0 K/UL (ref 0–0.5)
EOSINOPHIL # BLD AUTO: 0.1 K/UL (ref 0–0.5)
EOSINOPHIL NFR BLD: 2.5 % (ref 0–8)
EOSINOPHIL NFR BLD: 3.2 % (ref 0–8)
ERYTHROCYTE [DISTWIDTH] IN BLOOD BY AUTOMATED COUNT: 15.9 % (ref 11.5–14.5)
ERYTHROCYTE [DISTWIDTH] IN BLOOD BY AUTOMATED COUNT: 15.9 % (ref 11.5–14.5)
EST. GFR  (AFRICAN AMERICAN): >60 ML/MIN/1.73 M^2
EST. GFR  (NON AFRICAN AMERICAN): >60 ML/MIN/1.73 M^2
FRACTIONAL SHORTENING: 36 % (ref 28–44)
GLUCOSE SERPL-MCNC: 104 MG/DL (ref 70–110)
GLUCOSE UR QL STRIP: NEGATIVE
HCT VFR BLD AUTO: 29 % (ref 37–48.5)
HCT VFR BLD AUTO: 29.2 % (ref 37–48.5)
HGB BLD-MCNC: 9.1 G/DL (ref 12–16)
HGB BLD-MCNC: 9.3 G/DL (ref 12–16)
HGB UR QL STRIP: ABNORMAL
IMM GRANULOCYTES # BLD AUTO: 0 K/UL (ref 0–0.04)
IMM GRANULOCYTES # BLD AUTO: 0 K/UL (ref 0–0.04)
IMM GRANULOCYTES NFR BLD AUTO: 0 % (ref 0–0.5)
IMM GRANULOCYTES NFR BLD AUTO: 0 % (ref 0–0.5)
INTERVENTRICULAR SEPTUM: 0.92 CM (ref 0.6–1.1)
KETONES UR QL STRIP: NEGATIVE
LA MAJOR: 5.2 CM
LA MINOR: 5.1 CM
LA WIDTH: 2.83 CM
LEFT ATRIUM SIZE: 3.03 CM
LEFT ATRIUM VOLUME INDEX: 19.7 ML/M2
LEFT ATRIUM VOLUME: 37.53 CM3
LEFT INTERNAL DIMENSION IN SYSTOLE: 2.71 CM (ref 2.1–4)
LEFT VENTRICLE DIASTOLIC VOLUME INDEX: 42.35 ML/M2
LEFT VENTRICLE DIASTOLIC VOLUME: 80.65 ML
LEFT VENTRICLE MASS INDEX: 68 G/M2
LEFT VENTRICLE SYSTOLIC VOLUME INDEX: 14.3 ML/M2
LEFT VENTRICLE SYSTOLIC VOLUME: 27.19 ML
LEFT VENTRICULAR INTERNAL DIMENSION IN DIASTOLE: 4.25 CM (ref 3.5–6)
LEFT VENTRICULAR MASS: 129.32 G
LEUKOCYTE ESTERASE UR QL STRIP: NEGATIVE
LV LATERAL E/E' RATIO: 7.38 M/S
LV SEPTAL E/E' RATIO: 12 M/S
LYMPHOCYTES # BLD AUTO: 0.5 K/UL (ref 1–4.8)
LYMPHOCYTES # BLD AUTO: 0.6 K/UL (ref 1–4.8)
LYMPHOCYTES NFR BLD: 31.8 % (ref 18–48)
LYMPHOCYTES NFR BLD: 39 % (ref 18–48)
MAGNESIUM SERPL-MCNC: 1.9 MG/DL (ref 1.6–2.6)
MCH RBC QN AUTO: 33.5 PG (ref 27–31)
MCH RBC QN AUTO: 33.9 PG (ref 27–31)
MCHC RBC AUTO-ENTMCNC: 31.2 G/DL (ref 32–36)
MCHC RBC AUTO-ENTMCNC: 32.1 G/DL (ref 32–36)
MCV RBC AUTO: 106 FL (ref 82–98)
MCV RBC AUTO: 107 FL (ref 82–98)
MICROSCOPIC COMMENT: NORMAL
MONOCYTES # BLD AUTO: 0.3 K/UL (ref 0.3–1)
MONOCYTES # BLD AUTO: 0.3 K/UL (ref 0.3–1)
MONOCYTES NFR BLD: 16.6 % (ref 4–15)
MONOCYTES NFR BLD: 17 % (ref 4–15)
MV PEAK A VEL: 0.75 M/S
MV PEAK E VEL: 0.96 M/S
NEUTROPHILS # BLD AUTO: 0.7 K/UL (ref 1.8–7.7)
NEUTROPHILS # BLD AUTO: 0.8 K/UL (ref 1.8–7.7)
NEUTROPHILS NFR BLD: 40.9 % (ref 38–73)
NEUTROPHILS NFR BLD: 47.8 % (ref 38–73)
NITRITE UR QL STRIP: NEGATIVE
NRBC BLD-RTO: 0 /100 WBC
NRBC BLD-RTO: 0 /100 WBC
OVALOCYTES BLD QL SMEAR: ABNORMAL
PH UR STRIP: 7 [PH] (ref 5–8)
PHOSPHATE SERPL-MCNC: 3.1 MG/DL (ref 2.7–4.5)
PLATELET # BLD AUTO: 36 K/UL (ref 150–350)
PLATELET # BLD AUTO: 41 K/UL (ref 150–350)
PLATELET BLD QL SMEAR: ABNORMAL
PLATELET BLD QL SMEAR: ABNORMAL
PMV BLD AUTO: 10.1 FL (ref 9.2–12.9)
PMV BLD AUTO: 11.6 FL (ref 9.2–12.9)
POCT GLUCOSE: 104 MG/DL (ref 70–110)
POCT GLUCOSE: 86 MG/DL (ref 70–110)
POIKILOCYTOSIS BLD QL SMEAR: SLIGHT
POTASSIUM SERPL-SCNC: 3.9 MMOL/L (ref 3.5–5.1)
PROT SERPL-MCNC: 6.6 G/DL (ref 6–8.4)
PROT UR QL STRIP: NEGATIVE
PULM VEIN S/D RATIO: 1.11
PV PEAK D VEL: 0.47 M/S
PV PEAK S VEL: 0.52 M/S
RA MAJOR: 4.86 CM
RA PRESSURE: 3 MMHG
RA WIDTH: 3.79 CM
RBC # BLD AUTO: 2.72 M/UL (ref 4–5.4)
RBC # BLD AUTO: 2.74 M/UL (ref 4–5.4)
RBC #/AREA URNS AUTO: 2 /HPF (ref 0–4)
RIGHT VENTRICULAR END-DIASTOLIC DIMENSION: 4.6 CM
SINUS: 3.08 CM
SODIUM SERPL-SCNC: 142 MMOL/L (ref 136–145)
SP GR UR STRIP: >=1.03 (ref 1–1.03)
SQUAMOUS #/AREA URNS AUTO: 2 /HPF
TDI LATERAL: 0.13 M/S
TDI SEPTAL: 0.08 M/S
TDI: 0.11 M/S
TRICUSPID ANNULAR PLANE SYSTOLIC EXCURSION: 2.34 CM
URN SPEC COLLECT METH UR: ABNORMAL
WBC # BLD AUTO: 1.57 K/UL (ref 3.9–12.7)
WBC # BLD AUTO: 1.59 K/UL (ref 3.9–12.7)
WBC #/AREA URNS AUTO: 0 /HPF (ref 0–5)

## 2019-09-09 PROCEDURE — 84100 ASSAY OF PHOSPHORUS: CPT

## 2019-09-09 PROCEDURE — 85025 COMPLETE CBC W/AUTO DIFF WBC: CPT

## 2019-09-09 PROCEDURE — 20000000 HC ICU ROOM

## 2019-09-09 PROCEDURE — 99223 PR INITIAL HOSPITAL CARE,LEVL III: ICD-10-PCS | Mod: ,,, | Performed by: INTERNAL MEDICINE

## 2019-09-09 PROCEDURE — 92610 EVALUATE SWALLOWING FUNCTION: CPT

## 2019-09-09 PROCEDURE — 99223 1ST HOSP IP/OBS HIGH 75: CPT | Mod: ,,, | Performed by: INTERNAL MEDICINE

## 2019-09-09 PROCEDURE — 99291 CRITICAL CARE FIRST HOUR: CPT | Mod: ,,, | Performed by: ANESTHESIOLOGY

## 2019-09-09 PROCEDURE — 25000003 PHARM REV CODE 250: Performed by: NURSE PRACTITIONER

## 2019-09-09 PROCEDURE — 99900035 HC TECH TIME PER 15 MIN (STAT)

## 2019-09-09 PROCEDURE — 80053 COMPREHEN METABOLIC PANEL: CPT

## 2019-09-09 PROCEDURE — 25000003 PHARM REV CODE 250: Performed by: PSYCHIATRY & NEUROLOGY

## 2019-09-09 PROCEDURE — 99291 PR CRITICAL CARE, E/M 30-74 MINUTES: ICD-10-PCS | Mod: ,,, | Performed by: ANESTHESIOLOGY

## 2019-09-09 PROCEDURE — 99233 SBSQ HOSP IP/OBS HIGH 50: CPT | Mod: ,,, | Performed by: PSYCHIATRY & NEUROLOGY

## 2019-09-09 PROCEDURE — 94761 N-INVAS EAR/PLS OXIMETRY MLT: CPT

## 2019-09-09 PROCEDURE — 83735 ASSAY OF MAGNESIUM: CPT

## 2019-09-09 PROCEDURE — 81001 URINALYSIS AUTO W/SCOPE: CPT

## 2019-09-09 PROCEDURE — 99233 PR SUBSEQUENT HOSPITAL CARE,LEVL III: ICD-10-PCS | Mod: ,,, | Performed by: PSYCHIATRY & NEUROLOGY

## 2019-09-09 RX ORDER — MODAFINIL 200 MG/1
200 TABLET ORAL DAILY
Status: ON HOLD | COMMUNITY
End: 2019-09-09 | Stop reason: CLARIF

## 2019-09-09 RX ORDER — FENTANYL 37.5 UG/H
37.5 PATCH, EXTENDED RELEASE TRANSDERMAL
COMMUNITY

## 2019-09-09 RX ORDER — FENTANYL 25 UG/1
1 PATCH TRANSDERMAL
Status: ON HOLD | COMMUNITY
End: 2019-09-09 | Stop reason: CLARIF

## 2019-09-09 RX ORDER — ALPRAZOLAM 0.5 MG/1
0.5 TABLET ORAL DAILY PRN
COMMUNITY

## 2019-09-09 RX ORDER — POLYETHYLENE GLYCOL 3350 17 G/17G
17 POWDER, FOR SOLUTION ORAL DAILY
Status: DISCONTINUED | OUTPATIENT
Start: 2019-09-09 | End: 2019-09-12 | Stop reason: HOSPADM

## 2019-09-09 RX ORDER — MODAFINIL 100 MG/1
200 TABLET ORAL DAILY
Status: DISCONTINUED | OUTPATIENT
Start: 2019-09-10 | End: 2019-09-10

## 2019-09-09 RX ORDER — PANTOPRAZOLE SODIUM 40 MG/1
40 TABLET, DELAYED RELEASE ORAL DAILY
Status: DISCONTINUED | OUTPATIENT
Start: 2019-09-09 | End: 2019-09-12 | Stop reason: HOSPADM

## 2019-09-09 RX ORDER — ATORVASTATIN CALCIUM 20 MG/1
40 TABLET, FILM COATED ORAL DAILY
Status: DISCONTINUED | OUTPATIENT
Start: 2019-09-09 | End: 2019-09-12 | Stop reason: HOSPADM

## 2019-09-09 RX ORDER — PANTOPRAZOLE SODIUM 40 MG/1
40 TABLET, DELAYED RELEASE ORAL DAILY
COMMUNITY

## 2019-09-09 RX ORDER — POLYETHYLENE GLYCOL 3350 17 G/17G
POWDER, FOR SOLUTION ORAL
COMMUNITY

## 2019-09-09 RX ORDER — LEVOTHYROXINE SODIUM 25 UG/1
25 TABLET ORAL
Status: DISCONTINUED | OUTPATIENT
Start: 2019-09-09 | End: 2019-09-12 | Stop reason: HOSPADM

## 2019-09-09 RX ADMIN — ACETAMINOPHEN 650 MG: 325 TABLET ORAL at 02:09

## 2019-09-09 RX ADMIN — ACETAMINOPHEN 650 MG: 325 TABLET ORAL at 09:09

## 2019-09-09 RX ADMIN — OXYCODONE HYDROCHLORIDE 5 MG: 5 TABLET ORAL at 02:09

## 2019-09-09 RX ADMIN — OXYCODONE HYDROCHLORIDE 5 MG: 5 TABLET ORAL at 05:09

## 2019-09-09 RX ADMIN — OXYCODONE HYDROCHLORIDE 5 MG: 5 TABLET ORAL at 07:09

## 2019-09-09 RX ADMIN — LEVOTHYROXINE SODIUM 25 MCG: 25 TABLET ORAL at 12:09

## 2019-09-09 RX ADMIN — ACETAMINOPHEN 650 MG: 325 TABLET ORAL at 07:09

## 2019-09-09 NOTE — HPI
Beatriz Valente is a 53-year-old female with PMHx of HTN, HLD, DM II, BASSAM, and small cell lung cancer with mets seen on last MRI (currently receiving chemo and radiation).  Patient presented to OSH with L sided weakness, facial droop, and slurred speech.  CTH revealed no acute pathology. tPA was recommended and administered. CTA revealed right M2 occlusion, now s/p thrombectomy. Possible etiology embolic 2/2 her current risk factors as well as hypercoagulable from her cancer.  MRI showed petechial hemorrhage. TTE pending. NCC and stroke team following.     Functional History: Patient lives in [place] with  and daughter in a mobile home with 6 steps to enter.  Prior to admission, (I) with ADLs and mobility. DME: none.

## 2019-09-09 NOTE — PT/OT/SLP EVAL
"Speech Language Pathology Evaluation  Bedside Swallow    Patient Name:  Beatriz Valente   MRN:  [id number]  Admitting Diagnosis: Acute ischemic right MCA stroke    Recommendations:                 General Recommendations:  Dysphagia therapy, Speech language evaluation and Cognitive-linguistic evaluation  Diet recommendations:  Dental Soft, Thin   Aspiration Precautions: 1 bite/sip at a time, Alternating bites/sips, Feed only when awake/alert, HOB to 90 degrees, Meds crushed in puree, Monitor for s/s of aspiration, Remain upright 30 minutes post meal, Small bites/sips and Strict aspiration and reflux precautions   General Precautions: Standard, aspiration  Communication strategies:  none    History:     Past Medical History:   Diagnosis Date    Cancer 2018    Lung    Diabetes mellitus, type 2     Steriod induced, years ago, now glucose up and down    GERD (gastroesophageal reflux disease)     Hyperlipidemia     Sleep apnea        Past Surgical History:   Procedure Laterality Date    BONE BIOPSY  2018    Sacral bone    BREAST BIOPSY Right 2014    BREAST LUMPECTOMY Right     BREAST LUMPECTOMY Right 2014    CARPAL TUNNEL RELEASE       SECTION      CHOLECYSTECTOMY      DILATION AND CURETTAGE OF UTERUS  1987    ENDOMETRIAL ABLATION  02/15/2011    HAND SURGERY Left 2007    HAND SURGERY Right 2008    HEMORRHOID SURGERY  2010    LUNG BIOPSY Right 2018    MOUTH SURGERY  1980 and 1982    cyst removal    SALIVARY GLAND SURGERY Left 2018    Biopsy    THROAT SURGERY  10/1983    Remove vocal cord nodule    TONSILLECTOMY  1969    TUNNELED VENOUS PORT PLACEMENT  2018     Prior diet: regular/thin; appetite fluctuates with cancer/chemo.    Subjective     "not really" (pt stated when asked if she was hungry)    Pain/Comfort:  Pain Rating 1: 8/10  Location 1: head  Pain Addressed 1: Distraction, Nurse notified  Pain Rating Post-Intervention 1: (no " significant change reported.  Nursing aware and will administer meds when due.)    Objective:     Oral Musculature Evaluation  Oral Musculature: left weakness, WFL(mild)  Dentition: present and adequate  Mucosal Quality: adequate  Mandibular Strength and Mobility: WFL  Oral Labial Strength and Mobility: impaired retraction(mild weakness on left)  Lingual Strength and Mobility: WFL  Buccal Strength and Mobility: WFL  Volitional Cough: fair to good in strength; pt / daughter intermittent cough with post nasal drip  Voice Prior to PO Intake: pt/daughter endorse deep vocal quality at baseline; pt/daughter indicate voice is slightly more hoarse with some reduced intensity from baseline.      Bedside Swallow Eval:   Pt was seen on second attempt this date, as pt gone for test on first attempt.  Nursing reports tolerance of meds in puree.  Nursing moved pt from CPAP to nasal cannula for ST session.  SPO2 sats were stable % throughout session.    Consistencies Assessed:  · Thin liquids (ice chip x1, tsp x3, cup sip x4, straw sips (single x2 and consecutive x2)  · Puree (x5)  · Solids (x3)     Oral Phase:   · WFL    Pharyngeal Phase:   · Mild delay in initiation  · Laryngeal elevation appeared fair to palpation  · Pt denied pain or globus sensation with swallowing  · No overt coughing/choking/voice change evident post multiple thin liquid trials or solid boluses.  Pt noted to exhibit delayed cough post puree trial; however, no change in vocal quality evident.  SPO2 sats remained stable at %.  Pt contributed to post nasal drip and requested to blow nose following.  No further s/s of aspiration were observed given multiple other boluses.       Treatment: Education provided to pt/daughter regarding ST role, bedside swallow assessment, aspiration risk factors, s/s of aspiration to monitor for, recommendations, precautions and plan of care.  Further reviewed oral motor exercises.  Nursing present at end of session and  results were reviewed as well.  All questions were addressed. Pt reporting fatigue at end of trials and allowed to rest.     Assessment:     Beatriz Valente is a 53 y.o. female with an SLP diagnosis of mild oral motor weakness and risk for Dysphagia.  Further speech/language/cognitive evaluation to be completed.     Goals:   Multidisciplinary Problems     SLP Goals        Problem: SLP Goal    Goal Priority Disciplines Outcome   SLP Goal     SLP    Description:  Speech Language Pathology Goals  Goals expected to be met by 9/16  1. Pt will tolerate dental soft diet with thin liquid and no overt s/s of aspiration.   2. Pt will perform oral motor exercises x10.  3. Pt will participate in further assessment of speech/language/cognitive skills post CVA.                        Plan:     · Patient to be seen:  4 x/week   · Plan of Care expires:  10/08/19  · Plan of Care reviewed with:  patient, daughter   · SLP Follow-Up:  Yes       Discharge recommendations:  (TBD pending progress and any PT/OT recs)     Time Tracking:     SLP Treatment Date:   09/09/19  Speech Start Time:  1145  Speech Stop Time:  1208     Speech Total Time (min):  23 min    Billable Minutes: Eval Swallow and Oral Function 23    WHITLEY Marmolejo, CCC-SLP  Speech Language Pathologist  [phone]  9/9/2019

## 2019-09-09 NOTE — PROGRESS NOTES
ICU Progress Note  Neurocritical Care    Admit Date: 9/8/2019  LOS: 1    CC: Acute ischemic right MCA stroke    Code Status: Full Code     SUBJECTIVE:     Interval History/Significant Events:   small cell lung cancer undergoing chemotherapy-poa  Cytotoxic cerebral edema-poa  Embolic stroke right mca-poa  Brain compression-poa  hypokalemia  tpa received  Home cpap use- dependence   Essential hypertesnsion        Medications:  Continuous Infusions:   niCARdipine 2.5 mg/hr (09/09/19 0800)     Scheduled Meds:   levothyroxine  25 mcg Oral Daily     PRN Meds:.acetaminophen, oxyCODONE, sodium chloride 0.9%, sodium chloride 0.9%    OBJECTIVE:   Vital Signs (Most Recent):   Temp: 99.2 °F (37.3 °C) (09/09/19 0701)  Pulse: 95 (09/09/19 0800)  Resp: (!) 29 (09/09/19 0800)  BP: 111/70 (09/09/19 0800)  SpO2: (!) 92 % (09/09/19 0800)    Vital Signs (24h Range):   Temp:  [98 °F (36.7 °C)-99.2 °F (37.3 °C)] 99.2 °F (37.3 °C)  Pulse:  [] 95  Resp:  [10-29] 29  SpO2:  [92 %-100 %] 92 %  BP: (111-171)/(58-98) 111/70    ICP/CPP (Last 24h):        I & O (Last 24h):     Intake/Output Summary (Last 24 hours) at 9/9/2019 1045  Last data filed at 9/9/2019 0800  Gross per 24 hour   Intake 355.83 ml   Output 600 ml   Net -244.17 ml     Physical Exam:  GA: Alert, comfortable, no acute distress.   HEENT: No scleral icterus or JVD.   Pulmonary: Clear to auscultation A/P/L. No wheezing, crackles, or rhonchi.  Cardiac: RRR S1 & S2 w/o rubs/murmurs/gallops.   Abdominal: Bowel sounds present x 4. No appreciable hepatosplenomegaly.  Skin: No jaundice, rashes, or visible lesions.  Neuro:    Awake  Alert  ox4  Lefty ue plegia  Left facial droop    Vent Data:   Oxygen Concentration (%):  [24-32] 24    Lines/Drains/Airway:         Female External Urinary Catheter 09/09/19 0229 (Active)   Output (mL) 600 mL 9/9/2019  1:00 AM     Nutrition/Tube Feeds (if NPO state why): po    Labs:  ABG: No results for input(s): PH, PO2, PCO2, HCO3, POCSATURATED, BE  in the last 24 hours.  BMP:  Recent Labs   Lab 09/09/19  0143      K 3.9      CO2 25   BUN 12   CREATININE 0.7      MG 1.9   PHOS 3.1     LFT:   Lab Results   Component Value Date    AST 19 09/09/2019    ALT 16 09/09/2019    ALKPHOS 107 09/09/2019    BILITOT 0.3 09/09/2019    ALBUMIN 3.1 (L) 09/09/2019    PROT 6.6 09/09/2019     CBC:   Lab Results   Component Value Date    WBC 1.57 (LL) 09/09/2019    HGB 9.3 (L) 09/09/2019    HCT 29.0 (L) 09/09/2019     (H) 09/09/2019    PLT 36 (LL) 09/09/2019     Microbiology x 7d:   Microbiology Results (last 7 days)     ** No results found for the last 168 hours. **        Imaging:   MRI- Mild moderate region of right MCA distribution acute/recent infarction with petechial susceptibility in the parietal lobe components concerning for petechial hemorrhagic conversion.    There is localized mass effect without midline shift or hydrocephalus.    Scattered patchy and confluent foci of T2 FLAIR signal hyperintensity elsewhere supratentorial white matter with ill-defined component in the maura while nonspecific concerning for mild moderate underlying chronic ischemic change.  I personally reviewed the above image.    ASSESSMENT/PLAN:     Active Hospital Problems    Diagnosis    *Acute ischemic right MCA stroke    Embolic stroke involving right middle cerebral artery    Cytotoxic cerebral edema    Small cell lung cancer in adult    Brain compression    CVA (cerebral vascular accident)    HLD (hyperlipidemia)    Diabetes mellitus type II, controlled    Thrombocytopenia    Lung cancer metastatic to bone    Tobacco abuse          Plan:  Follow exam  Follow cbc  dw family  Duplex all exts  Follow with heme-onc  Wean cardene      Critical secondary to Patient has a condition that poses threat to life and bodily function: very concerned about decreasing wbc count/follow exam/dw family/allied services/weancardene    Cc time 30 mins     Critical care was time  spent personally by me on the following activities: development of treatment plan with patient or surrogate and bedside caregivers, discussions with consultants, evaluation of patient's response to treatment, examination of patient, ordering and performing treatments and interventions, ordering and review of laboratory studies, ordering and review of radiographic studies, pulse oximetry, re-evaluation of patient's condition. This critical care time did not overlap with that of any other provider or involve time for any procedures.

## 2019-09-09 NOTE — PLAN OF CARE
09/09/19 1351   Discharge Assessment   Assessment Type Discharge Planning Assessment   Confirmed/corrected address and phone number on facesheet? Yes   Assessment information obtained from? Patient;Caregiver   Expected Length of Stay (days) 4   Communicated expected length of stay with patient/caregiver yes   Prior to hospitilization cognitive status: Alert/Oriented   Prior to hospitalization functional status: Independent   Current cognitive status: Alert/Oriented   Current Functional Status: Needs Assistance   Facility Arrived From: [facility]   Lives With spouse   Able to Return to Prior Arrangements yes   Is patient able to care for self after discharge? Unable to determine at this time (comments)   Who are your caregiver(s) and their phone number(s)? Ted Ambrosio () [phone], Rabia Valente (dtr) [phone]   Patient's perception of discharge disposition home or selfcare   Readmission Within the Last 30 Days no previous admission in last 30 days   Patient currently being followed by outpatient case management? No   Patient currently receives any other outside agency services? No   Equipment Currently Used at Home none   Do you have any problems affording any of your prescribed medications? No   Is the patient taking medications as prescribed? yes   Does the patient have transportation home? Yes   Transportation Anticipated family or friend will provide   Does the patient receive services at the [facility]? No   Discharge Plan A Home with family;Home Health   Discharge Plan B Rehab   DME Needed Upon Discharge  other (see comments)  (tbd)   Patient/Family in Agreement with Plan yes         Discharge/ My Health Packet Folder Given to patient/family:    yes        PCP:  Roman Gonzalez MD        Pharmacy:    [id number] - [place], [place] - [address] AT [facility] [address]  [address] [place] [address]  Phone: [phone] Fax:  [phone]        Emergency Contacts:  Extended Emergency Contact Information  Primary Emergency Contact: Ted Valente  Address: [address] [address]  Home Phone: [phone]  Mobile Phone: [phone]  Relation: Spouse          Insurance:  Payor: [employer] BLUE [employer] / Plan: [facility] ALLA [employer] [facility] PLUS / Product Type: Commercial /     Lluvia Brewer RN, CCRN-K, [facility]  Neuro-Critical Care   X [phone]

## 2019-09-09 NOTE — CONSULTS
Inpatient consult to Physical Medicine Rehab  Consult performed by: Mikaela Zhu NP  Consult ordered by: Ender Wilkinson PA-C  Reason for consult: assess rehab needs        Reviewed patient history and current admission.  Rehab team following.  Full consult to follow.    IVORY Daley, FNP-C  Physical Medicine & Rehabilitation   09/09/2019

## 2019-09-09 NOTE — PT/OT/SLP PROGRESS
Occupational Therapy      Patient Name:  Beatriz Valente   MRN:  [id number]    Patient not seen today secondary to (pt having ultrasound x 2 attempts on this date.). Will follow-up 9/10/19.    SARKIS Taylor  9/9/2019

## 2019-09-09 NOTE — PLAN OF CARE
Problem: SLP Goal  Goal: SLP Goal  Speech Language Pathology Goals  Goals expected to be met by 9/16  1. Pt will tolerate dental soft diet with thin liquid and no overt s/s of aspiration.   2. Pt will perform oral motor exercises x10.  3. Pt will participate in further assessment of speech/language/cognitive skills post CVA.        Bedside Swallow Evaluation completed.  Full session note to follow.     WHITLEY Marmolejo, CCC-SLP  Speech Language Pathologist  [phone]  9/9/2019

## 2019-09-09 NOTE — PLAN OF CARE
Problem: Adult Inpatient Plan of Care  Goal: Plan of Care Review  Outcome: Ongoing (interventions implemented as appropriate)  POC reviewed with pt at 0500. Pt verbalized understanding. Questions and concerns addressed. Cardene gtt.  CPAP while sleeping.  No acute events overnight. Pt progressing toward goals. Will continue to monitor. See flowsheets for full assessment and VS info

## 2019-09-09 NOTE — ASSESSMENT & PLAN NOTE
53 y.o woman with a medical history of small cell lung carcinoma (currently receiving chemo and radiation with bony metastasis), HTN, DM2, HLD, current every day smoker and BASSAM presented to [facility] with sudden onset of facial droop and left sided plegia, NIHSS 16, VAN (+), GCS 15. Decision was made to give tPA at  and transfer to [facility] for thrombectomy. Upon arrival CTA multiphase showed right M2 occlusion. Decision was made to do thrombectomy. Will be admitted to [facility] post thrombectomy. Possible etiology embolic 2/2 her current risk factors as well as hypercoagulable from her cancer.  MRI showed petechial hemorrhage however do not see increase functional deficit.      Antithrombotics for secondary stroke prevention: Antiplatelets: Aspirin: 81 mg daily  Clopidogrel: 75 mg daily    Statins for secondary stroke prevention and hyperlipidemia, if present:   Statins: Atorvastatin- 80 mg daily    Aggressive risk factor modification: HTN, Smoking, DM, HLD, lung cancer     Rehab efforts: The patient has been evaluated by a stroke team provider and the therapy needs have been fully considered based off the presenting complaints and exam findings. The following therapy evaluations are needed: PT evaluate and treat, OT evaluate and treat, SLP evaluate and treat, PM&R evaluate for appropriate placement    Diagnostics ordered/pending: TTE to assess cardiac function/status  Pending     VTE prophylaxis: Heparin 5000 units SQ every 8 hours  None: Reason for No Pharmacological VTE Prophylaxis: Mechanical prophylaxis: Place SCDs,      BP parameters: Infarct: Post sucessful thrombectomy, SBP <140

## 2019-09-09 NOTE — NURSING TRANSFER
Nursing Transfer Note      9/9/2019     Transfer 9082 to MRI    Transfer via bed    Transfer with RN and PCT    Transported by RN and PCT    Medicines sent: Cardene gtt    Pt returned to room 9082. Pt tolerated transfer well.

## 2019-09-09 NOTE — SUBJECTIVE & OBJECTIVE
Neurologic Chief Complaint: left side facial droop with left sided extremity weakness.     Subjective:     Interval History: Patient is seen for follow-up neurological assessment and treatment recommendations: Patient's function is returning. According to her family her speech is better, facial expression is nearly normalizing, strength is well. Patient not able to feel on her left side.     HPI, Past Medical, Family, and Social History remains the same as documented in the initial encounter.     Review of Systems   Constitutional: Negative for chills, diaphoresis, fatigue and fever.   Eyes: Negative for photophobia and visual disturbance.   Cardiovascular: Negative for chest pain, palpitations and leg swelling.   Musculoskeletal: Positive for gait problem. Negative for arthralgias, back pain, joint swelling, myalgias, neck pain and neck stiffness.   Skin: Negative for color change, pallor and rash.   Neurological: Positive for facial asymmetry, weakness and headaches. Negative for dizziness, tremors, seizures, syncope, speech difficulty, light-headedness and numbness.   Psychiatric/Behavioral: Negative for agitation, confusion and decreased concentration.     Scheduled Meds:   levothyroxine  25 mcg Oral Before breakfast    [START ON 9/10/2019] modafinil  200 mg Oral Daily    pantoprazole  40 mg Oral Daily    polyethylene glycol  17 g Oral Daily     Continuous Infusions:   niCARdipine 2.5 mg/hr (09/09/19 0800)     PRN Meds:acetaminophen, oxyCODONE, sodium chloride 0.9%, sodium chloride 0.9%    Objective:     Vital Signs (Most Recent):  Temp: 99.2 °F (37.3 °C) (09/09/19 0701)  Pulse: 95 (09/09/19 1100)  Resp: (!) 22 (09/09/19 1100)  BP: 120/66 (09/09/19 0900)  SpO2: 100 % (09/09/19 1100)  BP Location: Left arm    Vital Signs Range (Last 24H):  Temp:  [98 °F (36.7 °C)-99.2 °F (37.3 °C)]   Pulse:  []   Resp:  [10-29]   BP: (106-171)/(58-98)   SpO2:  [88 %-100 %]   BP Location: Left arm    Physical Exam    Constitutional: She is oriented to person, place, and time. She appears well-developed and well-nourished.   HENT:   Head: Normocephalic.   Loss of hair   Eyes: Pupils are equal, round, and reactive to light.   Neck: Normal range of motion.   Cardiovascular: Normal rate, regular rhythm and normal heart sounds.   Pulmonary/Chest: Effort normal and breath sounds normal.   Abdominal: Soft. Bowel sounds are normal.   Neurological: She is alert and oriented to person, place, and time. A cranial nerve deficit and sensory deficit is present. She exhibits normal muscle tone. Coordination abnormal.   Skin: She is not diaphoretic.   Psychiatric: She has a normal mood and affect. Her behavior is normal.       Neurological Exam:   LOC: alert  Attention Span: Good   Language: No aphasia  Articulation: No dysarthria  Orientation: Person, Place, Time   Visual Fields: Full  EOM (CN III, IV, VI): Full/intact  Pupils (CN II, III): PERRL  Facial Sensation (CN V): Facial sensory loss  Facial Movement (CN VII): Lower facial weakness on the Left  Cebellar: No evidence of appendicular or axial ataxia  Sensation: Enmanuel-anesthesia left  Tone: Normal tone throughout    Laboratory:  CMP:   Recent Labs   Lab 09/09/19  0143   CALCIUM 9.0   ALBUMIN 3.1*   PROT 6.6      K 3.9   CO2 25      BUN 12   CREATININE 0.7   ALKPHOS 107   ALT 16   AST 19   BILITOT 0.3     CBC:   Recent Labs   Lab 09/09/19  0143   WBC 1.57*   RBC 2.74*   HGB 9.3*   HCT 29.0*   PLT 36*   *   MCH 33.9*   MCHC 32.1     Lipid Panel:   Recent Labs   Lab 09/08/19  1728   CHOL 181   LDLCALC 107.6   HDL 37*   TRIG 182*     Coagulation:   Recent Labs   Lab 09/08/19  1313   INR 0.9     Platelet Aggregation Study: No results for input(s): PLTAGG, PLTAGINTERP, PLTAGREGLACO, ADPPLTAGGREG in the last 168 hours.  Hgb A1C:   Recent Labs   Lab 09/08/19  1728   HGBA1C 4.5       Diagnostic Results     Brain Imaging   MRI: Mild moderate region of right MCA distribution  acute/recent infarction with petechial susceptibility in the parietal lobe components concerning for petechial hemorrhagic conversion.    There is localized mass effect without midline shift or hydrocephalus.  Scattered patchy and confluent foci of T2 FLAIR signal hyperintensity elsewhere supratentorial white matter with ill-defined component in the maura while nonspecific concerning for mild moderate underlying chronic ischemic change.    CTA: Abrupt occlusion of the lateral M2 segment of the right MCA.  No significant parenchymal hypoattenuation, edema/ mass effect, or hemorrhage.    Stenosis of the distal right vertebral artery.    Partially imaged right suprahilar/ apical lung mass, correlating with patient's history of lung cancer.    Indeterminate 2.1 cm left parotid mass, possible pleomorphic adenoma.  Vessel Imaging   Right central veins: The internal jugular, subclavian, and axillary veins are patent and free of thrombus.  Right arm veins: The brachial, basilic, and cephalic veins are patent and compressible.    Left central veins: The internal jugular, subclavian, and axillary veins are patent and free of thrombus.    Left arm veins: The brachial, basilic, and cephalic veins are patent and compressible.  Cardiac Imaging   Pending

## 2019-09-09 NOTE — PLAN OF CARE
Problem: Diabetes Comorbidity  Goal: Blood Glucose Level Within Desired Range    Intervention: Maintain Glycemic Control  POC reviewed with pt and family at 1400. Pt and family verbalized understanding. Questions and concerns addressed. Cardene titrated off; pt maintained SBP <140 throughout shift. Home CPAP used as needed per patient. MRI completed. Bath completed. No acute events today. Pt progressing toward goals. Will continue to monitor. See flowsheets for full assessment and VS info.

## 2019-09-09 NOTE — PROGRESS NOTES
[facility]  Vascular Neurology  Comprehensive Stroke Center  Progress Note    Assessment/Plan:     * Acute ischemic right MCA stroke  53 y.o woman with a medical history of small cell lung carcinoma (currently receiving chemo and radiation with bony metastasis), HTN, DM2, HLD, current every day smoker and BASSAM presented to [facility] with sudden onset of facial droop and left sided plegia, NIHSS 16, VAN (+), GCS 15. Decision was made to give tPA at  and transfer to [facility] for thrombectomy. Upon arrival CTA multiphase showed right M2 occlusion. Decision was made to do thrombectomy. Will be admitted to [facility] post thrombectomy. Possible etiology embolic 2/2 her current risk factors as well as hypercoagulable from her cancer.  MRI showed petechial hemorrhage however do not see increase functional deficit.      Antithrombotics for secondary stroke prevention: Antiplatelets: Aspirin: 81 mg daily  Clopidogrel: 75 mg daily    Statins for secondary stroke prevention and hyperlipidemia, if present:   Statins: Atorvastatin- 80 mg daily    Aggressive risk factor modification: HTN, Smoking, DM, HLD, lung cancer     Rehab efforts: The patient has been evaluated by a stroke team provider and the therapy needs have been fully considered based off the presenting complaints and exam findings. The following therapy evaluations are needed: PT evaluate and treat, OT evaluate and treat, SLP evaluate and treat, PM&R evaluate for appropriate placement    Diagnostics ordered/pending: TTE to assess cardiac function/status  Pending     VTE prophylaxis: Heparin 5000 units SQ every 8 hours  None: Reason for No Pharmacological VTE Prophylaxis: Mechanical prophylaxis: Place SCDs,      BP parameters: Infarct: Post sucessful thrombectomy, SBP <140        Diabetes mellitus type II, controlled  Unclear which medication patient takes for diabetes. In the hospital place patient on sliding scale.     HLD  (hyperlipidemia)  Atorvastatin 80 mg    Lung cancer metastatic to bone  Currently on chemotherapy and radiation    Tobacco abuse  Current every day smoker. Nicotine patch          9/9/19: MRI showed concerning for petechial hemorrhagic conversion    STROKE DOCUMENTATION   Acute Stroke Times   Last Known Normal Date: 09/08/19  Last Known Normal Time: 1230  Symptom Onset Date: 09/08/19  Symptom Onset Time: 1230  Stroke Team Called Date: 09/08/19  Stroke Team Called Time: 1500  Stroke Team Arrival Date: 09/08/19  Stroke Team Arrival Time: 1500  CT Interpretation Time: 1217  Decision to Treat Time for Alteplase: 1326    NIH Scale:  1a. Level of Consciousness: 0-->Alert, keenly responsive  1b. LOC Questions: 0-->Answers both questions correctly  1c. LOC Commands: 0-->Performs both tasks correctly  2. Best Gaze: 0-->Normal  3. Visual: 0-->No visual loss  4. Facial Palsy: 1-->Minor paralysis (flattened nasolabial fold, asymmetry on smiling)  5a. Motor Arm, Left: 0-->No drift, limb holds 90 (or 45) degrees for full 10 secs  5b. Motor Arm, Right: 0-->No drift, limb holds 90 (or 45) degrees for full 10 secs  6a. Motor Leg, Left: 0-->No drift, leg holds 30 degree position for full 5 secs  6b. Motor Leg, Right: 0-->No drift, leg holds 30 degree position for full 5 secs  7. Limb Ataxia: 0-->Absent  8. Sensory: 2-->Severe to total sensory loss, patient is not aware of being touched in the face, arm, and leg  9. Best Language: 1-->Mild-to-moderate aphasia, some obvious loss of fluency or facility of comprehension, without significant limitation on ideas expressed or form of expression. Reduction of speech and/or comprehension, however, makes conversation. . . (see row details)  10. Dysarthria: 0-->Normal  11. Extinction and Inattention (formerly Neglect): 0-->No abnormality  Total (NIH Stroke Scale): 4       Modified [place]    Holden Coma Scale:15   ABCD2 Score:    [id number] Score:   HAS -BLED Score:   ICH Score:   [employer]we[employer]  Classification:      Hemorrhagic change of an Ischemic Stroke: Does this patient have an ischemic stroke with hemorrhagic changes? Yes, Grading Scale: HI Type 1 (HI-1) = small petechiae along the margins of the infarct. Is this a symptomatic change?  No - Hemorrhage is not clinically significant     Neurologic Chief Complaint: left side facial droop with left sided extremity weakness.     Subjective:     Interval History: Patient is seen for follow-up neurological assessment and treatment recommendations: Patient's function is returning. According to her family her speech is better, facial expression is nearly normalizing, strength is well. Patient not able to feel on her left side.     HPI, Past Medical, Family, and Social History remains the same as documented in the initial encounter.     Review of Systems   Constitutional: Negative for chills, diaphoresis, fatigue and fever.   Eyes: Negative for photophobia and visual disturbance.   Cardiovascular: Negative for chest pain, palpitations and leg swelling.   Musculoskeletal: Positive for gait problem. Negative for arthralgias, back pain, joint swelling, myalgias, neck pain and neck stiffness.   Skin: Negative for color change, pallor and rash.   Neurological: Positive for facial asymmetry, weakness and headaches. Negative for dizziness, tremors, seizures, syncope, speech difficulty, light-headedness and numbness.   Psychiatric/Behavioral: Negative for agitation, confusion and decreased concentration.     Scheduled Meds:   levothyroxine  25 mcg Oral Before breakfast    [START ON 9/10/2019] modafinil  200 mg Oral Daily    pantoprazole  40 mg Oral Daily    polyethylene glycol  17 g Oral Daily     Continuous Infusions:   niCARdipine 2.5 mg/hr (09/09/19 0800)     PRN Meds:acetaminophen, oxyCODONE, sodium chloride 0.9%, sodium chloride 0.9%    Objective:     Vital Signs (Most Recent):  Temp: 99.2 °F (37.3 °C) (09/09/19 0701)  Pulse: 95 (09/09/19 1100)  Resp: (!) 22  (09/09/19 1100)  BP: 120/66 (09/09/19 0900)  SpO2: 100 % (09/09/19 1100)  BP Location: Left arm    Vital Signs Range (Last 24H):  Temp:  [98 °F (36.7 °C)-99.2 °F (37.3 °C)]   Pulse:  []   Resp:  [10-29]   BP: (106-171)/(58-98)   SpO2:  [88 %-100 %]   BP Location: Left arm    Physical Exam   Constitutional: She is oriented to person, place, and time. She appears well-developed and well-nourished.   HENT:   Head: Normocephalic.   Loss of hair   Eyes: Pupils are equal, round, and reactive to light.   Neck: Normal range of motion.   Cardiovascular: Normal rate, regular rhythm and normal heart sounds.   Pulmonary/Chest: Effort normal and breath sounds normal.   Abdominal: Soft. Bowel sounds are normal.   Neurological: She is alert and oriented to person, place, and time. A cranial nerve deficit and sensory deficit is present. She exhibits normal muscle tone. Coordination abnormal.   Skin: She is not diaphoretic.   Psychiatric: She has a normal mood and affect. Her behavior is normal.       Neurological Exam:   LOC: alert  Attention Span: Good   Language: No aphasia  Articulation: No dysarthria  Orientation: Person, Place, Time   Visual Fields: Full  EOM (CN III, IV, VI): Full/intact  Pupils (CN II, III): PERRL  Facial Sensation (CN V): Facial sensory loss  Facial Movement (CN VII): Lower facial weakness on the Left  Cebellar: No evidence of appendicular or axial ataxia  Sensation: Enmanuel-anesthesia left  Tone: Normal tone throughout    Laboratory:  CMP:   Recent Labs   Lab 09/09/19  0143   CALCIUM 9.0   ALBUMIN 3.1*   PROT 6.6      K 3.9   CO2 25      BUN 12   CREATININE 0.7   ALKPHOS 107   ALT 16   AST 19   BILITOT 0.3     CBC:   Recent Labs   Lab 09/09/19  0143   WBC 1.57*   RBC 2.74*   HGB 9.3*   HCT 29.0*   PLT 36*   *   MCH 33.9*   MCHC 32.1     Lipid Panel:   Recent Labs   Lab 09/08/19  1728   CHOL 181   LDLCALC 107.6   HDL 37*   TRIG 182*     Coagulation:   Recent Labs   Lab 09/08/19  1316    INR 0.9     Platelet Aggregation Study: No results for input(s): PLTAGG, PLTAGINTERP, PLTAGREGLACO, ADPPLTAGGREG in the last 168 hours.  Hgb A1C:   Recent Labs   Lab 09/08/19  1728   HGBA1C 4.5       Diagnostic Results     Brain Imaging   MRI: Mild moderate region of right MCA distribution acute/recent infarction with petechial susceptibility in the parietal lobe components concerning for petechial hemorrhagic conversion.    There is localized mass effect without midline shift or hydrocephalus.  Scattered patchy and confluent foci of T2 FLAIR signal hyperintensity elsewhere supratentorial white matter with ill-defined component in the maura while nonspecific concerning for mild moderate underlying chronic ischemic change.    CTA: Abrupt occlusion of the lateral M2 segment of the right MCA.  No significant parenchymal hypoattenuation, edema/ mass effect, or hemorrhage.    Stenosis of the distal right vertebral artery.    Partially imaged right suprahilar/ apical lung mass, correlating with patient's history of lung cancer.    Indeterminate 2.1 cm left parotid mass, possible pleomorphic adenoma.  Vessel Imaging   Right central veins: The internal jugular, subclavian, and axillary veins are patent and free of thrombus.  Right arm veins: The brachial, basilic, and cephalic veins are patent and compressible.    Left central veins: The internal jugular, subclavian, and axillary veins are patent and free of thrombus.    Left arm veins: The brachial, basilic, and cephalic veins are patent and compressible.  Cardiac Imaging   Pending      TAL Hurtado MD  Comprehensive Stroke Center  Department of Vascular Neurology   [facility]

## 2019-09-09 NOTE — PLAN OF CARE
09/09/19 1407   Post-Acute Status   Post-Acute Authorization Placement;Other   Post-Acute Placement Status Awaiting Therapy Documentation   Discharge Delays None known at this time       Lluvia Brewer RN, CCRN-K, [facility]  Neuro-Critical Care   X [phone]

## 2019-09-09 NOTE — PLAN OF CARE
Problem: Adult Inpatient Plan of Care  Goal: Plan of Care Review  Outcome: Ongoing (interventions implemented as appropriate)  Nutrition assessment completed. Please see RD note for details.    Recommendation/Intervention:   As medically able, recommend advancing diet to Regular with texture per SLP recommendations.   If BG elevates, recommend adding Diabetic restriction.     RD to monitor.    Goals: Pt to receive and tolerate >85% EEN and EPN by RD follow up  Nutrition Goal Status: new  Communication of RD Recs: reviewed with RN

## 2019-09-09 NOTE — CONSULTS
"    [facility]  Adult Nutrition  Consult Note    SUMMARY     Recommendations    Recommendation/Intervention:   As medically able, recommend advancing diet to Regular with texture per SLP recommendations.   If BG elevates, recommend adding Diabetic restriction.     RD to monitor.    Goals: Pt to receive and tolerate >85% EEN and EPN by RD follow up  Nutrition Goal Status: new  Communication of RD Recs: reviewed with RN    Reason for Assessment    Reason For Assessment: consult  Diagnosis: stroke/CVA  Relevant Medical History: HLD, HTN, T2DM, SCLC with mets  Interdisciplinary Rounds: attended  General Information Comments: Unable to see pt x 2 attempts. Therapies and US at bedside. Pt remains NPO however SLP recs for dental soft/thin liquids. Pt noted to have 26lb weight loss (12%) over previous 10 months. Unable to confirm weight loss or po intake PTA.   Nutrition Discharge Planning: adequate po intake for optimal nutrition    Nutrition Risk Screen    Nutrition Risk Screen: no indicators present    Nutrition/Diet History    Spiritual, Cultural Beliefs, Presybeterian Practices, Values that Affect Care: no  Factors Affecting Nutritional Intake: NPO    Anthropometrics    Temp: 99.2 °F (37.3 °C)  Height: 5' 3" (160 cm)  Height (inches): 63 in  Weight Method: Standard Scale  Weight: 87.5 kg (192 lb 14.4 oz)  Weight (lb): 192.9 lb  Ideal Body Weight (IBW), Female: 115 lb  % Ideal Body Weight, Female (lb): 167.74 lb  BMI (Calculated): 34.2  BMI Grade: 30 - 34.9- obesity - grade I       Lab/Procedures/Meds    Pertinent Labs Reviewed: reviewed  Pertinent Labs Comments: HgbA1c 4.5, POCT Glu   Pertinent Medications Reviewed: reviewed  Pertinent Medications Comments: cardene      Estimated/Assessed Needs    Weight Used For Calorie Calculations: 87.5 kg (192 lb 14.4 oz)  Energy Calorie Requirements (kcal): 6669-5233  Energy Need Method: Kcal/kg(30-35kcal/kg)  Protein Requirements: " 114-132g(1.3-1.5g/kg)  Weight Used For Protein Calculations: 87.5 kg (192 lb 14.4 oz)  Fluid Requirements (mL): 1mL/kcal or per MD     RDA Method (mL): 2625  CHO Requirement: 50% of total kcals      Nutrition Prescription Ordered    Current Diet Order: NPO    Evaluation of Received Nutrient/Fluid Intake       % Intake of Estimated Energy Needs: 0 - 25 %  % Meal Intake: NPO    Nutrition Risk    Level of Risk/Frequency of Follow-up: high(f/u 2x/week)     Assessment and Plan    Nutrition Problem  Inadequate energy intake    Related to (etiology):   NPO    Signs and Symptoms (as evidenced by):   Pt receiving <85% EEN and EPN.     Intervention:  Collaboration of nutrition care with providers    Nutrition Diagnosis Status:   New    Monitor and Evaluation    Food and Nutrient Intake: energy intake, food and beverage intake  Food and Nutrient Adminstration: diet order  Anthropometric Measurements: weight, body mass index, weight change  Biochemical Data, Medical Tests and Procedures: electrolyte and renal panel, gastrointestinal profile, glucose/endocrine profile, inflammatory profile, lipid profile  Nutrition-Focused Physical Findings: overall appearance       Nutrition Follow-Up    RD Follow-up?: Yes

## 2019-09-09 NOTE — HOSPITAL COURSE
Ms. Beatriz Valente was admitted to Neuro ICU for higher level of care s/p tPA and IR thrombectomy. Stroke etiology suspected to be embolism 2/2 hypercoagulable state in the setting of known malignancy at this time. Unable to initiate an antithrombotic regimen at this time due to pt with thrombocytopenia 2/2 chemo. Please see below regarding plans to initiate on an outpatient basis.    Patient discharged with recommendations for outpatient therapy. Family at bedside amenable to plan.     Patient with improvement in stroke symptoms since admission. Inpatient acute stroke work up completed and patient stable for discharge. Please see all appropriate medication changes, imaging results, and necessary follow-up below.

## 2019-09-09 NOTE — CONSULTS
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 She did not get Neulasta after that treatment. Pt is admitted  after sx of left sided weakness, facial droop, found to have CVA with R M2 occlusion and now is s/p trhombectomy and tPA.    Her leukopenia and thrombocytopenia are likely secondary to this recent chemotherapy treatment.    Should start to see count recovery over next week. Please re-consult if count recovery does not occur after a week. Please support with transfusions for Hgb < 7 and plts > 50 K, in setting of recent thrombolytic use. No need to treat neutropenia unless pt is having fevers.    Discussed above with neurocritical care team.     Jessika Robins MD  Hematology/Oncology Fellow, PGY V  [facility]

## 2019-09-10 LAB
ALBUMIN SERPL BCP-MCNC: 3 G/DL (ref 3.5–5.2)
ALP SERPL-CCNC: 95 U/L (ref 55–135)
ALT SERPL W/O P-5'-P-CCNC: 14 U/L (ref 10–44)
ANION GAP SERPL CALC-SCNC: 10 MMOL/L (ref 8–16)
ANISOCYTOSIS BLD QL SMEAR: SLIGHT
ANISOCYTOSIS BLD QL SMEAR: SLIGHT
AST SERPL-CCNC: 18 U/L (ref 10–40)
BASOPHILS # BLD AUTO: 0.01 K/UL (ref 0–0.2)
BASOPHILS # BLD AUTO: 0.01 K/UL (ref 0–0.2)
BASOPHILS NFR BLD: 0.5 % (ref 0–1.9)
BASOPHILS NFR BLD: 0.6 % (ref 0–1.9)
BILIRUB SERPL-MCNC: 0.4 MG/DL (ref 0.1–1)
BLD PROD TYP BPU: NORMAL
BLOOD UNIT EXPIRATION DATE: NORMAL
BLOOD UNIT TYPE CODE: 5100
BLOOD UNIT TYPE: NORMAL
BUN SERPL-MCNC: 14 MG/DL (ref 6–20)
CALCIUM SERPL-MCNC: 9.1 MG/DL (ref 8.7–10.5)
CHLORIDE SERPL-SCNC: 108 MMOL/L (ref 95–110)
CO2 SERPL-SCNC: 24 MMOL/L (ref 23–29)
CODING SYSTEM: NORMAL
CREAT SERPL-MCNC: 0.6 MG/DL (ref 0.5–1.4)
DACRYOCYTES BLD QL SMEAR: ABNORMAL
DIFFERENTIAL METHOD: ABNORMAL
DIFFERENTIAL METHOD: ABNORMAL
DISPENSE STATUS: NORMAL
EOSINOPHIL # BLD AUTO: 0 K/UL (ref 0–0.5)
EOSINOPHIL # BLD AUTO: 0.1 K/UL (ref 0–0.5)
EOSINOPHIL NFR BLD: 2 % (ref 0–8)
EOSINOPHIL NFR BLD: 3.2 % (ref 0–8)
ERYTHROCYTE [DISTWIDTH] IN BLOOD BY AUTOMATED COUNT: 15.6 % (ref 11.5–14.5)
ERYTHROCYTE [DISTWIDTH] IN BLOOD BY AUTOMATED COUNT: 15.7 % (ref 11.5–14.5)
EST. GFR  (AFRICAN AMERICAN): >60 ML/MIN/1.73 M^2
EST. GFR  (NON AFRICAN AMERICAN): >60 ML/MIN/1.73 M^2
GLUCOSE SERPL-MCNC: 82 MG/DL (ref 70–110)
HCT VFR BLD AUTO: 27.1 % (ref 37–48.5)
HCT VFR BLD AUTO: 27.3 % (ref 37–48.5)
HGB BLD-MCNC: 8.5 G/DL (ref 12–16)
HGB BLD-MCNC: 9 G/DL (ref 12–16)
IMM GRANULOCYTES # BLD AUTO: 0.01 K/UL (ref 0–0.04)
IMM GRANULOCYTES # BLD AUTO: 0.02 K/UL (ref 0–0.04)
IMM GRANULOCYTES NFR BLD AUTO: 0.6 % (ref 0–0.5)
IMM GRANULOCYTES NFR BLD AUTO: 1 % (ref 0–0.5)
LYMPHOCYTES # BLD AUTO: 0.5 K/UL (ref 1–4.8)
LYMPHOCYTES # BLD AUTO: 0.7 K/UL (ref 1–4.8)
LYMPHOCYTES NFR BLD: 33.8 % (ref 18–48)
LYMPHOCYTES NFR BLD: 35 % (ref 18–48)
MAGNESIUM SERPL-MCNC: 1.8 MG/DL (ref 1.6–2.6)
MCH RBC QN AUTO: 33.6 PG (ref 27–31)
MCH RBC QN AUTO: 33.7 PG (ref 27–31)
MCHC RBC AUTO-ENTMCNC: 31.4 G/DL (ref 32–36)
MCHC RBC AUTO-ENTMCNC: 33 G/DL (ref 32–36)
MCV RBC AUTO: 102 FL (ref 82–98)
MCV RBC AUTO: 107 FL (ref 82–98)
MONOCYTES # BLD AUTO: 0.2 K/UL (ref 0.3–1)
MONOCYTES # BLD AUTO: 0.4 K/UL (ref 0.3–1)
MONOCYTES NFR BLD: 14.9 % (ref 4–15)
MONOCYTES NFR BLD: 18.2 % (ref 4–15)
NEUTROPHILS # BLD AUTO: 0.7 K/UL (ref 1.8–7.7)
NEUTROPHILS # BLD AUTO: 0.9 K/UL (ref 1.8–7.7)
NEUTROPHILS NFR BLD: 43.3 % (ref 38–73)
NEUTROPHILS NFR BLD: 46.9 % (ref 38–73)
NRBC BLD-RTO: 0 /100 WBC
NRBC BLD-RTO: 0 /100 WBC
OVALOCYTES BLD QL SMEAR: ABNORMAL
PHOSPHATE SERPL-MCNC: 3 MG/DL (ref 2.7–4.5)
PLATELET # BLD AUTO: 31 K/UL (ref 150–350)
PLATELET # BLD AUTO: 36 K/UL (ref 150–350)
PLATELET BLD QL SMEAR: ABNORMAL
PLATELET BLD QL SMEAR: ABNORMAL
PMV BLD AUTO: 12.3 FL (ref 9.2–12.9)
PMV BLD AUTO: 12.6 FL (ref 9.2–12.9)
POCT GLUCOSE: 119 MG/DL (ref 70–110)
POCT GLUCOSE: 90 MG/DL (ref 70–110)
POIKILOCYTOSIS BLD QL SMEAR: SLIGHT
POTASSIUM SERPL-SCNC: 3.8 MMOL/L (ref 3.5–5.1)
PROT SERPL-MCNC: 6.2 G/DL (ref 6–8.4)
RBC # BLD AUTO: 2.53 M/UL (ref 4–5.4)
RBC # BLD AUTO: 2.67 M/UL (ref 4–5.4)
SODIUM SERPL-SCNC: 142 MMOL/L (ref 136–145)
SPHEROCYTES BLD QL SMEAR: ABNORMAL
TRANS PLATPHERESIS VOL PATIENT: NORMAL ML
WBC # BLD AUTO: 1.54 K/UL (ref 3.9–12.7)
WBC # BLD AUTO: 2.03 K/UL (ref 3.9–12.7)

## 2019-09-10 PROCEDURE — 99233 SBSQ HOSP IP/OBS HIGH 50: CPT | Mod: ,,, | Performed by: ANESTHESIOLOGY

## 2019-09-10 PROCEDURE — 36430 TRANSFUSION BLD/BLD COMPNT: CPT

## 2019-09-10 PROCEDURE — 84100 ASSAY OF PHOSPHORUS: CPT

## 2019-09-10 PROCEDURE — 99252 PR INITIAL INPATIENT CONSULT,LEVL II: ICD-10-PCS | Mod: ,,, | Performed by: NURSE PRACTITIONER

## 2019-09-10 PROCEDURE — P9035 PLATELET PHERES LEUKOREDUCED: HCPCS

## 2019-09-10 PROCEDURE — 25000003 PHARM REV CODE 250: Performed by: PSYCHIATRY & NEUROLOGY

## 2019-09-10 PROCEDURE — 97161 PT EVAL LOW COMPLEX 20 MIN: CPT

## 2019-09-10 PROCEDURE — 80053 COMPREHEN METABOLIC PANEL: CPT

## 2019-09-10 PROCEDURE — 99233 PR SUBSEQUENT HOSPITAL CARE,LEVL III: ICD-10-PCS | Mod: ,,, | Performed by: ANESTHESIOLOGY

## 2019-09-10 PROCEDURE — 99252 IP/OBS CONSLTJ NEW/EST SF 35: CPT | Mod: ,,, | Performed by: NURSE PRACTITIONER

## 2019-09-10 PROCEDURE — 83735 ASSAY OF MAGNESIUM: CPT

## 2019-09-10 PROCEDURE — 92523 SPEECH SOUND LANG COMPREHEN: CPT

## 2019-09-10 PROCEDURE — 85025 COMPLETE CBC W/AUTO DIFF WBC: CPT

## 2019-09-10 PROCEDURE — 94761 N-INVAS EAR/PLS OXIMETRY MLT: CPT

## 2019-09-10 PROCEDURE — 97165 OT EVAL LOW COMPLEX 30 MIN: CPT

## 2019-09-10 PROCEDURE — 97530 THERAPEUTIC ACTIVITIES: CPT

## 2019-09-10 PROCEDURE — 25000003 PHARM REV CODE 250: Performed by: NURSE PRACTITIONER

## 2019-09-10 PROCEDURE — 20000000 HC ICU ROOM

## 2019-09-10 PROCEDURE — 92526 ORAL FUNCTION THERAPY: CPT

## 2019-09-10 RX ORDER — LIDOCAINE HYDROCHLORIDE 10 MG/ML
INJECTION, SOLUTION EPIDURAL; INFILTRATION; INTRACAUDAL; PERINEURAL
Status: DISPENSED
Start: 2019-09-10 | End: 2019-09-11

## 2019-09-10 RX ORDER — HYDROCODONE BITARTRATE AND ACETAMINOPHEN 500; 5 MG/1; MG/1
TABLET ORAL
Status: DISCONTINUED | OUTPATIENT
Start: 2019-09-10 | End: 2019-09-11

## 2019-09-10 RX ADMIN — OXYCODONE HYDROCHLORIDE 5 MG: 5 TABLET ORAL at 06:09

## 2019-09-10 RX ADMIN — MODAFINIL 200 MG: 100 TABLET ORAL at 08:09

## 2019-09-10 RX ADMIN — LEVOTHYROXINE SODIUM 25 MCG: 25 TABLET ORAL at 06:09

## 2019-09-10 RX ADMIN — ATORVASTATIN CALCIUM 40 MG: 20 TABLET, FILM COATED ORAL at 08:09

## 2019-09-10 RX ADMIN — PANTOPRAZOLE SODIUM 40 MG: 40 TABLET, DELAYED RELEASE ORAL at 08:09

## 2019-09-10 RX ADMIN — OXYCODONE HYDROCHLORIDE 5 MG: 5 TABLET ORAL at 01:09

## 2019-09-10 RX ADMIN — POLYETHYLENE GLYCOL 3350 17 G: 17 POWDER, FOR SOLUTION ORAL at 08:09

## 2019-09-10 RX ADMIN — OXYCODONE HYDROCHLORIDE 5 MG: 5 TABLET ORAL at 03:09

## 2019-09-10 RX ADMIN — OXYCODONE HYDROCHLORIDE 5 MG: 5 TABLET ORAL at 09:09

## 2019-09-10 RX ADMIN — ACETAMINOPHEN 650 MG: 325 TABLET ORAL at 06:09

## 2019-09-10 RX ADMIN — OXYCODONE HYDROCHLORIDE 5 MG: 5 TABLET ORAL at 11:09

## 2019-09-10 NOTE — ASSESSMENT & PLAN NOTE
-CTA revealed right M2 occlusion, now s/p thrombectomy  - Possible etiology embolic 2/2 her current risk factors as well as hypercoagulable from her cancer.  -MRI showed petechial hemorrhage, TTE pending    See hospital course for functional, cognitive/speech/language, and nutrition/swallow status.      Recommendations  -  Encourage mobility, OOB in chair at least 3 hours per day, and early ambulation as appropriate   -  PT/OT evaluate and treat  -  SLP speech and cognitive evaluate and treat  -  Monitor sleep disturbances and establish consistent sleep-wake cycle  -  Monitor for bowel and bladder dysfunction  -  Monitor for shoulder pain, subluxation, & spasticity  -  Monitor for and prevent skin breakdown and pressure ulcers  · Early mobility, repositioning/weight shifting every 20-30 minutes when sitting, turn patient every 2 hours, proper mattress/overlay and chair cushioning, pressure relief/heel protector boots  -  DVT prophylaxis (if appropriate)  -  Reviewed discharge options (IP rehab, SNF, HH therapy, and OP therapy)

## 2019-09-10 NOTE — PT/OT/SLP EVAL
Speech Language Pathology Evaluation  Cognitive Communication    Patient Name:  Beatriz Valente   MRN:  [id number]  Admitting Diagnosis: Acute ischemic right MCA stroke    Recommendations:     Recommendations:                General Recommendations:  Dysphagia therapy  Diet recommendations:  Dental Soft, Thin   Aspiration Precautions: 1 bite/sip at a time, Alternating bites/sips, Feed only when awake/alert, HOB to 90 degrees, Meds whole 1 at a time, Monitor for s/s of aspiration, Small bites/sips and Standard aspiration precautions   General Precautions: Standard, aspiration, fall  Communication strategies:  none    History:     Past Medical History:   Diagnosis Date    Cancer 2018    Lung    Diabetes mellitus, type 2     Steriod induced, years ago, now glucose up and down    GERD (gastroesophageal reflux disease)     Hyperlipidemia     Sleep apnea        Past Surgical History:   Procedure Laterality Date    BONE BIOPSY  2018    Sacral bone    BREAST BIOPSY Right 2014    BREAST LUMPECTOMY Right     BREAST LUMPECTOMY Right 2014    CARPAL TUNNEL RELEASE       SECTION      CHOLECYSTECTOMY      DILATION AND CURETTAGE OF UTERUS  1987    ENDOMETRIAL ABLATION  02/15/2011    HAND SURGERY Left 2007    HAND SURGERY Right 2008    HEMORRHOID SURGERY  2010    LUNG BIOPSY Right 2018    MOUTH SURGERY  1980 and 1982    cyst removal    SALIVARY GLAND SURGERY Left 2018    Biopsy    THROAT SURGERY  10/1983    Remove vocal cord nodule    TONSILLECTOMY  1969    TUNNELED VENOUS PORT PLACEMENT  2018       Social History: Patient lives with her .    Prior Intubation HX:  None this admission    Modified Barium Swallow: none this admission    Chest X-Rays: 19:  Right chest wall port catheter tip stable.  There has been no significant interval detrimental change in the cardiopulmonary status since the previous exam, no pneumothorax.    Prior diet:  regular with thin liquids.    Occupation/hobbies/homemaking: Pt works in [employer] service.  She did not report any hobbies as she is still working full time and undergoing treatment for her Cancer.      Subjective     Pt was awake and alert in NAD.  She was agreeable to evaluation  Patient goals: none expressed     Pain/Comfort:  · Pain Rating 1: 0/10  · Pain Rating Post-Intervention 1: 0/10    Objective:   Cognitive Status:    Arousal/Alertness Appropriate response to stimuli  Attention No obvious deficits observed WFL  Orientation Oriented x4  Memory WFL, Immediate Recall 100%, [id number]% acc given min cue and recall general information 100%  Problem Solving WFL, Categories 100%, Sequencing 100%, Solutions 100% and Compare/contrast 100%  Reasoning Cause/effect comment 100%      Receptive Language:   Comprehension:      WNL  Questions Complex yes/no 100%  Commands  multistep basic commands 100%  complex/abstract commands 100%  Conversation WNL for simple and complex comprehension    Pragmatics:    WNL    Expressive Language:  Verbal:    WNL  Automatic Speech  Days of the week 100%  Repetition Sentences 100%  Naming Confrontation 100%, Convergent 100%, Divergent responsive 100% and Single word responsive naming 100%  Conversational speech WNL with no evidence of word-finding for simple and compelx expression      Motor Speech:  WNL    Voice:   WNL    Visual-Spatial:  WNL    Reading:   WNL     Written Expression:   WNL    Treatment: Pt was seated upright with HOB elevated.  She was offered and accepted po trials of thin liquids by straw, tsp bites of puree and self fed bite of soft solids.  She tolerated all trials well with no overt signs of aspiration and adequate oral clearance.  Mild anterior spillage seen with tsp of puree d/t decreased labial clearance with upper lip with spoon stripping resulting in spillage onto face with feeding of which pt was unaware.  She reported decreased labial sensation.  Given cues, pt  was able to clean spilled puree and had no spillage with the following tsp bites.  No other significant oral stasis or spillage was seen with any other trial.  Education was provided to pt and family re: slp role, eval results, aspiration precautions and slp poc. They indicated good understanding.    Assessment:   Beatriz Valente is a 53 y.o. female with an SLP diagnosis of Dysphagia.  She presents with functional cognitive-communication skills and mild oral dysphagia.    Goals:   Multidisciplinary Problems     SLP Goals        Problem: SLP Goal    Goal Priority Disciplines Outcome   SLP Goal     SLP Ongoing (interventions implemented as appropriate)   Description:  Speech Language Pathology Goals  Goals expected to be met by 9/16  1. Pt will tolerate dental soft diet with thin liquid and no overt s/s of aspiration.   2. Pt will perform oral motor exercises x10.  3. Pt will participate in further assessment of speech/language/cognitive skills post CVA. -goal met                         Plan:   · Patient to be seen:  3 x/week   · Plan of Care expires:  10/08/19  · Plan of Care reviewed with:  patient, daughter   · SLP Follow-Up:  Yes       Discharge recommendations:  Discharge Facility/Level of Care Needs: other (see comments)(pending pt/ot recs)   Barriers to Discharge:  Inaccessible Home Environment    Time Tracking:   SLP Treatment Date:   09/10/19  Speech Start Time:  0920  Speech Stop Time:  0953     Speech Total Time (min):  33 min    Billable Minutes: Eval 23  and Treatment Swallowing Dysfunction 10    Anat Joya CCC-SLP  09/10/2019

## 2019-09-10 NOTE — PT/OT/SLP EVAL
Occupational Therapy   Evaluation    Name: Beatriz Valente  MRN: [id number]  Admitting Diagnosis:  Acute ischemic right MCA stroke      Recommendations:     Discharge Recommendations: outpatient OT  Discharge Equipment Recommendations:  none  Barriers to discharge:  None    Assessment:     Beatriz Valente is a 53 y.o. female with a medical diagnosis of Acute ischemic right MCA stroke.  She presents with decreased independence with ADL's & mobility.  Pt with good participation and motivation.  Pt safe to ambulate to bathroom for toileting with staff assistance. Performance deficits affecting function: weakness, impaired endurance, impaired sensation, impaired functional mobilty, impaired self care skills, impaired balance, decreased upper extremity function, impaired coordination, impaired fine motor.      Rehab Prognosis: Good; patient would benefit from acute skilled OT services to address these deficits and reach maximum level of function.       Plan:     Patient to be seen 3 x/week to address the above listed problems via self-care/home management, therapeutic activities, therapeutic exercises, neuromuscular re-education, sensory integration  · Plan of Care Expires: 10/10/19  · Plan of Care Reviewed with: patient, daughter    Subjective     Chief Complaint: mild numbness  Patient/Family Comments/goals: to get back to work    Occupational Profile:  Living Environment & PLOF: Pt resides with spouse & daughter in mobile home with 6 steps no rails to enter.  Pt has a garden tub & shower stall for bathing.  Pt reports that after discharge she can go to her mother's house that is 2 story with 1 step to enter & bed & bath on 1st floor.  Pt was independent with ADL's & mobility PTA.  Pt is an active  & works in clerical work.  Pt enjoys resting & is looking forward to spending time with  grandchild.    Equipment Used at Home:  none  Assistance upon Discharge: family    Pain/Comfort:  · Pain Rating 1:  0/10  · Pain Rating Post-Intervention 1: 0/10    Patients cultural, spiritual, Sabianist conflicts given the current situation: no    Objective:     Communicated with: RN prior to session.  Patient found supine with telemetry, pulse ox (continuous), blood pressure cuff(daughter present in room) upon OT entry to room.    General Precautions: Standard, aspiration, fall     Occupational Performance:    Functional Mobility/Transfers:  · Patient completed Sit <> Stand Transfer with contact guard assistance  with  no assistive device from chair  · Functional Mobility: hand held assistance with functional mobility in room    Activities of Daily Living:  · Upper Body Dressing: stand by assistance donning gown around back seated in chair  · Lower Body Dressing: stand by assistance donning socks seated in chair; pt with noted decreased coordination with left hand during activity    Cognitive/Visual Perceptual:  Cognitive/Psychosocial Skills:     -       Oriented to: Person, Place, Time and Situation   -       Follows Commands/attention:Follows multistep  commands  -       Communication: clear/fluent  -       Memory: No Deficits noted  -       Safety awareness/insight to disability: intact   -       Mood/Affect/Coping skills/emotional control: Appropriate to situation  Visual/Perceptual:      -Intact  tracking and peripheral vision to (B) directions      Physical Exam:  Postural examination/scapula alignment:    -       Rounded shoulders  -       Forward head  Sensation:    -       Impaired  light/touch LUE (especially hand)  Dominant hand:    -       right  Upper Extremity Range of Motion:     -       Right Upper Extremity: WNL  -       Left Upper Extremity: WNL  Upper Extremity Strength:    -       Right Upper Extremity: WFL  -       Left Upper Extremity: WFL   Strength:    -       Right Upper Extremity: WFL  -       Left Upper Extremity: WFL however mildly decreased in comparison to RUE  Fine Motor Coordination:    -        Impaired  Left hand, finger to nose  , Left hand thumb/finger opposition skills  , Left hand, manipulation of objects   and Left hand, diadochokinesis skill      AMPAC 6 Click ADL:  AMPAC Total Score: 18    Treatment & Education:  Provided education regarding role of OT, POC, & discharge recommendations with pt & family verbalizing understanding.  Pt with questions regarding when she can start to drive & go back to work therefore discussed at length potential time frame limitations/scenerios based on mobility, vision, & or medical needs.  Recommended that ultimately up to neuro MD's as to how soon she can resume work & driving.  All education provided within OT scope of practice. Pt had no further questions & when asked whether there were any concerns pt reported none.    Education:  Patient left up in chair with all lines intact, call button in reach, RN notified, daughter present and white board updated.    GOALS:   Multidisciplinary Problems     Occupational Therapy Goals        Problem: Occupational Therapy Goal    Goal Priority Disciplines Outcome Interventions   Occupational Therapy Goal     OT, PT/OT Ongoing (interventions implemented as appropriate)    Description:  Goals to be met by: 9/20     Patient will increase functional independence with ADLs by performing:    UE Dressing with Modified [place].  LE Dressing with Modified [place].  Grooming while standing with Modified [place].  Toileting from toilet with Modified [place] for hygiene and clothing management.   Supine to sit with Modified [place].  Stand pivot transfers with Modified [place].                      History:     Past Medical History:   Diagnosis Date    Cancer 03/28/2018    Lung    Diabetes mellitus, type 2     Steriod induced, years ago, now glucose up and down    GERD (gastroesophageal reflux disease)     Hyperlipidemia     Sleep apnea        Past Surgical History:   Procedure Laterality Date     BONE BIOPSY  2018    Sacral bone    BREAST BIOPSY Right 2014    BREAST LUMPECTOMY Right     BREAST LUMPECTOMY Right 2014    CARPAL TUNNEL RELEASE       SECTION      CHOLECYSTECTOMY      DILATION AND CURETTAGE OF UTERUS  1987    ENDOMETRIAL ABLATION  02/15/2011    HAND SURGERY Left 2007    HAND SURGERY Right 2008    HEMORRHOID SURGERY  2010    LUNG BIOPSY Right 2018    MOUTH SURGERY  1980 and 1982    cyst removal    SALIVARY GLAND SURGERY Left 2018    Biopsy    THROAT SURGERY  10/1983    Remove vocal cord nodule    TONSILLECTOMY  1969    TUNNELED VENOUS PORT PLACEMENT  2018       Time Tracking:     OT Date of Treatment: 09/10/19  OT Start Time: 1333  OT Stop Time: 1406  OT Total Time (min): 33 min    Billable Minutes:Evaluation 23  Therapeutic Activity 10    SARKIS Taylor  9/10/2019

## 2019-09-10 NOTE — PLAN OF CARE
Problem: Occupational Therapy Goal  Goal: Occupational Therapy Goal  Goals to be met by: 9/20     Patient will increase functional independence with ADLs by performing:    UE Dressing with Modified [place].  LE Dressing with Modified [place].  Grooming while standing with Modified [place].  Toileting from toilet with Modified [place] for hygiene and clothing management.   Supine to sit with Modified [place].  Stand pivot transfers with Modified [place].    Outcome: Ongoing (interventions implemented as appropriate)  OT eval completed.

## 2019-09-10 NOTE — CONSULTS
[facility]  Physical Medicine & Rehab  Consult Note    Patient Name: Beatriz Valente  MRN: [id number]  Admission Date: 2019  Hospital Length of Stay: 2 days  Attending Physician: Erick Camacho MD     Inpatient consult to Physical Medicine & Rehabilitation  Consult performed by: Mikaela Zhu NP  Consult requested by:  Erick Camacho MD    Reason for Consult:  assess rehabilitation needs  Consults  Subjective:     Principal Problem: Acute ischemic right MCA stroke    HPI: Beatriz Valente is a 53-year-old female with PMHx of HTN, HLD, DM II, BASSAM, and small cell lung cancer with mets seen on last MRI (currently receiving chemo and radiation).  Patient presented to OSH with L sided weakness, facial droop, and slurred speech.  CTH revealed no acute pathology. tPA was recommended and administered. CTA revealed right M2 occlusion, now s/p thrombectomy. Possible etiology embolic 2/2 her current risk factors as well as hypercoagulable from her cancer.  MRI showed petechial hemorrhage. TTE pending. NCC and stroke team following.     Functional History: Patient lives in [place] with  and daughter in a mobile home with 6 steps to enter.  Prior to admission, (I) with ADLs and mobility. DME: none.    Hospital Course:   2019: Passed bedside swallow evaluation.  SLP recommending dental soft diet and thin liquids. SLP diagnosis of mild oral motor weakness and risk for Dysphagia .  9/10/19: PT/OT evals pending.     Past Medical History:   Diagnosis Date    Cancer 2018    Lung    Diabetes mellitus, type 2     Steriod induced, years ago, now glucose up and down    GERD (gastroesophageal reflux disease)     Hyperlipidemia     Sleep apnea      Past Surgical History:   Procedure Laterality Date    BONE BIOPSY  2018    Sacral bone    BREAST BIOPSY Right 2014    BREAST LUMPECTOMY Right     BREAST LUMPECTOMY Right 2014    CARPAL TUNNEL RELEASE       SECTION       CHOLECYSTECTOMY      DILATION AND CURETTAGE OF UTERUS  06/1987    ENDOMETRIAL ABLATION  02/15/2011    HAND SURGERY Left 12/2007    HAND SURGERY Right 01/2008    HEMORRHOID SURGERY  09/03/2010    LUNG BIOPSY Right 03/28/2018    MOUTH SURGERY  1980 and 1982    cyst removal    SALIVARY GLAND SURGERY Left 04/16/2018    Biopsy    THROAT SURGERY  10/1983    Remove vocal cord nodule    TONSILLECTOMY  1969    TUNNELED VENOUS PORT PLACEMENT  04/18/2018     Review of patient's allergies indicates:   Allergen Reactions    Rocephin [ceftriaxone] Shortness Of Breath    Augmentin [amoxicillin-pot clavulanate] Rash       Scheduled Medications:    atorvastatin  40 mg Oral Daily    levothyroxine  25 mcg Oral Before breakfast    pantoprazole  40 mg Oral Daily    polyethylene glycol  17 g Oral Daily       PRN Medications: acetaminophen, oxyCODONE, sodium chloride 0.9%, sodium chloride 0.9%    Family History     Unknown per patient.         Tobacco Use    Smoking status: Current Every Day Smoker     Packs/day: 0.50     Types: Cigarettes     Start date: 1980    Smokeless tobacco: Never Used   Substance and Sexual Activity    Alcohol use: Yes     Comment: Occassional    Drug use: No    Sexual activity: Yes     Partners: Male     Review of Systems   Constitutional: Positive for activity change and fatigue. Negative for fever.   HENT: Negative for trouble swallowing and voice change.    Eyes: Negative for photophobia and visual disturbance.   Respiratory: Negative for cough and shortness of breath.    Cardiovascular: Negative for chest pain and palpitations.   Gastrointestinal: Negative for nausea and vomiting.   Genitourinary: Negative for difficulty urinating and flank pain.   Musculoskeletal: Positive for gait problem. Negative for arthralgias.   Skin: Negative for color change and rash.   Neurological: Positive for weakness and numbness. Negative for facial asymmetry and headaches.   Psychiatric/Behavioral:  Negative for agitation and confusion.     Objective:     Vital Signs (Most Recent):  Temp: 98 °F (36.7 °C) (09/10/19 0701)  Pulse: 94 (09/10/19 1001)  Resp: 20 (09/10/19 1001)  BP: 113/60 (09/10/19 1001)  SpO2: 99 % (09/10/19 1001)    Vital Signs (24h Range):  Temp:  [98 °F (36.7 °C)-99.2 °F (37.3 °C)] 98 °F (36.7 °C)  Pulse:  [] 94  Resp:  [11-31] 20  SpO2:  [93 %-100 %] 99 %  BP: ()/() 113/60     Body mass index is 34.18 kg/m².    Physical Exam   Constitutional: She is oriented to person, place, and time. She appears well-developed and well-nourished. She appears ill.   HENT:   Head: Normocephalic and atraumatic.   Eyes: Right eye exhibits no discharge. Left eye exhibits no discharge.   Neck: Neck supple.   Cardiovascular: Normal rate and intact distal pulses.   Pulmonary/Chest: Effort normal. No respiratory distress.   Abdominal: Soft. There is no tenderness.   Musculoskeletal: She exhibits no edema or deformity.   L sided weakness    Neurological: She is alert and oriented to person, place, and time. A sensory deficit (L side) is present. She exhibits normal muscle tone.   Follows commands    Skin: Skin is warm and dry.   Psychiatric: She has a normal mood and affect. Her behavior is normal. Cognition and memory are not impaired.   Vitals reviewed.      Diagnostic Results:   Labs: Reviewed  ECG: Reviewed  X-Ray: Reviewed  CT: Reviewed  MRI: Reviewed    Assessment/Plan:     * Acute ischemic right MCA stroke  -CTA revealed right M2 occlusion, now s/p thrombectomy  - Possible etiology embolic 2/2 her current risk factors as well as hypercoagulable from her cancer.  -MRI showed petechial hemorrhage, TTE pending    See hospital course for functional, cognitive/speech/language, and nutrition/swallow status.      Recommendations  -  Encourage mobility, OOB in chair at least 3 hours per day, and early ambulation as appropriate   -  PT/OT evaluate and treat  -  SLP speech and cognitive evaluate and  treat  -  Monitor sleep disturbances and establish consistent sleep-wake cycle  -  Monitor for bowel and bladder dysfunction  -  Monitor for shoulder pain, subluxation, & spasticity  -  Monitor for and prevent skin breakdown and pressure ulcers  · Early mobility, repositioning/weight shifting every 20-30 minutes when sitting, turn patient every 2 hours, proper mattress/overlay and chair cushioning, pressure relief/heel protector boots  -  DVT prophylaxis (if appropriate)  -  Reviewed discharge options (IP rehab, SNF, HH therapy, and OP therapy)    Lung cancer metastatic to bone  -Currently on chemotherapy and radiation    Therapy evaluations pending.  Will follow progress and discuss with rehab team for post acute care/rehab recommendation.      Thank you for your consult.     Mikaela Zhu NP  Department of Physical Medicine & Rehab  [facility]

## 2019-09-10 NOTE — NURSING
Transported pt to CT via bed with telemetry monitored and ambubag @ BS. Pt tolerated well and returned to room 9082. Will continued to monitor.

## 2019-09-10 NOTE — SUBJECTIVE & OBJECTIVE
Past Medical History:   Diagnosis Date    Cancer 2018    Lung    Diabetes mellitus, type 2     Steriod induced, years ago, now glucose up and down    GERD (gastroesophageal reflux disease)     Hyperlipidemia     Sleep apnea      Past Surgical History:   Procedure Laterality Date    BONE BIOPSY  2018    Sacral bone    BREAST BIOPSY Right 2014    BREAST LUMPECTOMY Right     BREAST LUMPECTOMY Right 2014    CARPAL TUNNEL RELEASE       SECTION      CHOLECYSTECTOMY      DILATION AND CURETTAGE OF UTERUS  1987    ENDOMETRIAL ABLATION  02/15/2011    HAND SURGERY Left 2007    HAND SURGERY Right 2008    HEMORRHOID SURGERY  2010    LUNG BIOPSY Right 2018    MOUTH SURGERY  1980 and 1982    cyst removal    SALIVARY GLAND SURGERY Left 2018    Biopsy    THROAT SURGERY  10/1983    Remove vocal cord nodule    TONSILLECTOMY  1969    TUNNELED VENOUS PORT PLACEMENT  2018     Review of patient's allergies indicates:   Allergen Reactions    Rocephin [ceftriaxone] Shortness Of Breath    Augmentin [amoxicillin-pot clavulanate] Rash       Scheduled Medications:    atorvastatin  40 mg Oral Daily    levothyroxine  25 mcg Oral Before breakfast    pantoprazole  40 mg Oral Daily    polyethylene glycol  17 g Oral Daily       PRN Medications: acetaminophen, oxyCODONE, sodium chloride 0.9%, sodium chloride 0.9%    Family History     None        Tobacco Use    Smoking status: Current Every Day Smoker     Packs/day: 0.50     Types: Cigarettes     Start date:     Smokeless tobacco: Never Used   Substance and Sexual Activity    Alcohol use: Yes     Comment: Occassional    Drug use: No    Sexual activity: Yes     Partners: Male     Review of Systems   Constitutional: Positive for activity change and fatigue. Negative for fever.   HENT: Negative for trouble swallowing and voice change.    Eyes: Negative for photophobia and visual disturbance.   Respiratory:  Negative for cough and shortness of breath.    Cardiovascular: Negative for chest pain and palpitations.   Gastrointestinal: Negative for nausea and vomiting.   Genitourinary: Negative for difficulty urinating and flank pain.   Musculoskeletal: Positive for gait problem. Negative for arthralgias.   Skin: Negative for color change and rash.   Neurological: Positive for weakness and numbness. Negative for facial asymmetry and headaches.   Psychiatric/Behavioral: Negative for agitation and confusion.     Objective:     Vital Signs (Most Recent):  Temp: 98 °F (36.7 °C) (09/10/19 0701)  Pulse: 94 (09/10/19 1001)  Resp: 20 (09/10/19 1001)  BP: 113/60 (09/10/19 1001)  SpO2: 99 % (09/10/19 1001)    Vital Signs (24h Range):  Temp:  [98 °F (36.7 °C)-99.2 °F (37.3 °C)] 98 °F (36.7 °C)  Pulse:  [] 94  Resp:  [11-31] 20  SpO2:  [93 %-100 %] 99 %  BP: ()/() 113/60     Body mass index is 34.18 kg/m².    Physical Exam   Constitutional: She is oriented to person, place, and time. She appears well-developed and well-nourished. She appears ill.   HENT:   Head: Normocephalic and atraumatic.   Eyes: Right eye exhibits no discharge. Left eye exhibits no discharge.   Neck: Neck supple.   Cardiovascular: Normal rate and intact distal pulses.   Pulmonary/Chest: Effort normal. No respiratory distress.   Abdominal: Soft. There is no tenderness.   Musculoskeletal: She exhibits no edema or deformity.   L sided weakness    Neurological: She is alert and oriented to person, place, and time. A sensory deficit (L side) is present. She exhibits normal muscle tone.   Follows commands    Skin: Skin is warm and dry.   Psychiatric: She has a normal mood and affect. Her behavior is normal. Cognition and memory are not impaired.   Vitals reviewed.    NEUROLOGICAL EXAMINATION:     MENTAL STATUS   Oriented to person, place, and time.       Diagnostic Results:   Labs: Reviewed  ECG: Reviewed  X-Ray: Reviewed  CT: Reviewed  MRI: Reviewed

## 2019-09-10 NOTE — HOSPITAL COURSE
09/9/2019: Passed bedside swallow evaluation.  SLP recommending dental soft diet and thin liquids. SLP diagnosis of mild oral motor weakness and risk for Dysphagia.  9/10/19: 09/10/2019: Sit to stand CGA.  Ambulated 10 ft x 2 trials and 180 turn CGA. UBD and LBD SBA. Passed bedside swallow evaluation.  SLP recommending dental soft diet and thin liquids. Normal cog.

## 2019-09-10 NOTE — PLAN OF CARE
Problem: Physical Therapy Goal  Goal: Physical Therapy Goal  Goals to be met by: 19     Patient will increase functional independence with mobility by performin. Supine to sit with Modified [place]  2. Sit to stand transfer with Supervision  3. Gait  x 200 feet with Supervision with or without using least restrictive AD.   4. Ascend/descend 3 stairs with handrail as needed with SBA   5. Lower extremity exercise program x20 reps per handout, with independence    Outcome: Ongoing (interventions implemented as appropriate)  PT evaluation completed- see note for details. Goals established and POC initiated. Once medically stable, recommending pt d/c home with family support and OP PT.     Judy Norris, PT, DPT   9/10/2019  Pager: [phone]

## 2019-09-10 NOTE — PLAN OF CARE
Problem: Adult Inpatient Plan of Care  Goal: Plan of Care Review  Outcome: Ongoing (interventions implemented as appropriate)  POC reviewed with pt and family at 1400. Pt verbalized understanding. Questions and concerns addressed with pt and daughter and in agreement with POC. No acute events today. CPAP from home used when pt sleeps. Sys <140 maintained with no issues. Pt up in chair today with good strength. CT done today. Pt progressing toward goals. Will continue to monitor. See flowsheets for full assessment and VS info.

## 2019-09-10 NOTE — PLAN OF CARE
Problem: Adult Inpatient Plan of Care  Goal: Plan of Care Review  Outcome: Ongoing (interventions implemented as appropriate)  POC reviewed with pt at 0500. Pt verbalized understanding. Questions and concerns addressed with pt and family. No acute events overnight. Pt progressing toward goals. Will continue to monitor. See flowsheets for full assessment and VS info

## 2019-09-10 NOTE — PLAN OF CARE
Problem: SLP Goal  Goal: SLP Goal  Speech Language Pathology Goals  Goals expected to be met by 9/16  1. Pt will tolerate dental soft diet with thin liquid and no overt s/s of aspiration.   2. Pt will perform oral motor exercises x10.  3. Pt will participate in further assessment of speech/language/cognitive skills post CVA. -goal met       Outcome: Ongoing (interventions implemented as appropriate)  Speech Language Cognitive evaluation completed.  Goals updated.  Pt with normal cognitive-communication skills.  Cont ST per POC.    Anat Joya CCC-SLP  9/10/2019

## 2019-09-10 NOTE — PROGRESS NOTES
ICU Progress Note  Neurocritical Care    Admit Date: 9/8/2019  LOS: 2    CC: Acute ischemic right MCA stroke    Code Status: Full Code     SUBJECTIVE:     Interval History/Significant Events:     Worsening thrombocytopenia  sclc  Cytotoxic cerebral edema  Hypokalemia      Medications:  Continuous Infusions:   niCARdipine Stopped (09/09/19 1200)     Scheduled Meds:   atorvastatin  40 mg Oral Daily    levothyroxine  25 mcg Oral Before breakfast    pantoprazole  40 mg Oral Daily    polyethylene glycol  17 g Oral Daily     PRN Meds:.acetaminophen, oxyCODONE, sodium chloride 0.9%, sodium chloride 0.9%    OBJECTIVE:   Vital Signs (Most Recent):   Temp: 98 °F (36.7 °C) (09/10/19 0701)  Pulse: 94 (09/10/19 1001)  Resp: 20 (09/10/19 1001)  BP: 113/60 (09/10/19 1001)  SpO2: 99 % (09/10/19 1001)    Vital Signs (24h Range):   Temp:  [98 °F (36.7 °C)-99.2 °F (37.3 °C)] 98 °F (36.7 °C)  Pulse:  [] 94  Resp:  [11-31] 20  SpO2:  [93 %-100 %] 99 %  BP: ()/() 113/60    ICP/CPP (Last 24h):        I & O (Last 24h):     Intake/Output Summary (Last 24 hours) at 9/10/2019 1058  Last data filed at 9/10/2019 0801  Gross per 24 hour   Intake 325 ml   Output 1600 ml   Net -1275 ml     Physical Exam:  Psych- no issues    GA: Alert, comfortable, no acute distress.   HEENT: No scleral icterus or JVD.   Pulmonary: Clear to auscultation A/P/L. No wheezing, crackles, or rhonchi.  Cardiac: RRR S1 & S2 w/o rubs/murmurs/gallops.   Abdominal: Bowel sounds present x 4. No appreciable hepatosplenomegaly.  Skin: No jaundice, rashes, or visible lesions.  Neuro:   awake  Alert  ox4      Lines/Drains/Airway:       none  Female External Urinary Catheter 09/09/19 0229 (Active)   Skin no redness;no breakdown;perineum cleansed w/ soap and water 9/10/2019  7:01 AM   Tolerance no signs/symptoms of discomfort 9/10/2019  7:01 AM   Suction Continuous suction at 40 mmHg 9/10/2019  7:01 AM   Date of last wick change 09/10/19 9/10/2019  7:01 AM    Time of last wick change 0700 9/10/2019  7:01 AM   Output (mL) 600 mL 9/9/2019  1:00 AM     Nutrition/Tube Feeds (if NPO state why):  po     Labs:  ABG: No results for input(s): PH, PO2, PCO2, HCO3, POCSATURATED, BE in the last 24 hours.  BMP:  Recent Labs   Lab 09/10/19  0230      K 3.8      CO2 24   BUN 14   CREATININE 0.6   GLU 82   MG 1.8   PHOS 3.0     LFT:   Lab Results   Component Value Date    AST 18 09/10/2019    ALT 14 09/10/2019    ALKPHOS 95 09/10/2019    BILITOT 0.4 09/10/2019    ALBUMIN 3.0 (L) 09/10/2019    PROT 6.2 09/10/2019     CBC:   Lab Results   Component Value Date    WBC 1.54 (LL) 09/10/2019    HGB 8.5 (L) 09/10/2019    HCT 27.1 (L) 09/10/2019     (H) 09/10/2019    PLT 36 (LL) 09/10/2019     Microbiology x 7d:   Microbiology Results (last 7 days)     ** No results found for the last 168 hours. **        Imaging:    ECHO- 65%  No shunting     I personally reviewed the above image.    ASSESSMENT/PLAN:     Active Hospital Problems    Diagnosis    *Acute ischemic right MCA stroke    Embolic stroke involving right middle cerebral artery    Cytotoxic cerebral edema    Small cell lung cancer in adult    Brain compression    CPAP (continuous positive airway pressure) dependence    CVA (cerebral vascular accident)    HLD (hyperlipidemia)    Diabetes mellitus type II, controlled    Thrombocytopenia    Lung cancer metastatic to bone    Tobacco abuse       Plan:  Recheck cbc  Ct head  Follow exam      Level;3

## 2019-09-10 NOTE — PT/OT/SLP EVAL
Physical Therapy Evaluation    Patient Name:  Beatriz Valente  MRN: [id number]  Recent Surgery: * No surgery found *      Recommendations:     Discharge Recommendations: Outpatient PT   Equipment recommendations: none  Barriers to discharge: Increased level of skilled assistance required    Assessment:     Beatriz Valente is a 53 y.o. female admitted [place] [facility] [place] with medical diagnosis of Acute ischemic right MCA stroke. Pt presents with weakness, impaired balance, decreased endurance and impaired functional mobility . Beatriz Valente would benefit from acute PT intervention to address listed functional deficits, provide patient/caregiver education, reduce fall risk, and maximize (I) and safety with functional mobility. Once medically stable, recommending pt discharge home with family support and OP PT.     Rehab Prognosis: Good; based on positive indicators including strong caregiver support and pt motivation to participate    Plan:     During this hospitalization, patient to be seen 3 x/week to address the identified rehab impairments via gait training, therapeutic activities, therapeutic exercises, neuromuscular re-education and progress towards stated goals.     Plan of Care Expires:  10/09/19  Plan of Care reviewed with: patient and family    This plan of care has been discussed with the patient/caregiver, who was included in its development and is in agreement with the identified goals and treatment plan.     Subjective     Communicated with RN prior to session.  Patient agreeable to participate. Pt's daughter at bedside.     Patient comments/goals: to get back to work as soon as possible     Pain/Comfort:  · Pt reported no pain at rest or with activity     Patients cultural, spiritual, [employer]al conflicts given the current situation: No known conflicts     Patient History: information obtained from pt      Living Environment: Pt lives with spouse and daughter in mobile home with 6 HUGO. Bathroom set-up:  garden tub. Pt stated that if needed she may discharge to her mother's home (2SH with 1 HUGO, Br/Ba on 1st level)  Prior Level of Function:(I) with mobility and ADLs; employed doing clerical work   DME owned: none  Caregiver Assistance: family assist as needed     Objective:     Patient found with: telemetry, blood pressure cuff and continuous pulse oximeter    General Precautions: Fall and Standard  Orthopedic Precautions: N/A  Braces: N/A  Oxygen Device: pt on room air     Exams:     Cognition:  o Pt is alert and oriented x 4  o Pt yasir to follow single-step commands promptly throughout session   o Good safety awareness     Sensation:   intact light touch sensation BLEs     Gross Motor Coordination: WFL BLEs     Postural examination/scapula alignment:     -       Rounded shoulders     Lower Extremity Range of Motion:  o Right Lower Extremity: WFL AROM  o Left Lower Extremity: WFL AROM     Lower Extremity Strength:  o Right Lower Extremity: 5/5  o Left Lower Extremity: 5/5    Functional Mobility:    Bed Mobility:  · N/T 2/2 pt sitting up in chair upon PT arrival     Transfers:   · Sit <> Stand Transfer: Contact Guard Assistance from bedside chair with no AD     Standing Balance: Fair+    · Pt performed static standing with eyes closed, no UE support x 30 second trial with no LOB   · Tolerated moderate balance perturbations in standing with no UE support, no LOB, SBA provided                  Gait:  · Distance: ~10 ft. X 2 trials with 180 degree directional turns to R and L   · Assistance level: Contact Guard Assistance  · Assistive Device: none  · Gait Assessment: recipricol gait pattern with no overt LOB; mild instability noted with decreased gait speed     Outcome Measure: AM-PAC 6 CLICK MOBILITY  Total Score:19     Patient/Caregiver Education:     Therapist educated pt/caregiver regarding:    PT POC and goals for therapy    Safety with mobility and fall risk    Instruction on use of call button and  importance of calling nursing staff for assistance with mobility     Patient/caregiver able to verbalize understanding; will follow-up with pt/caregiver during current admit for additional questions/concerns within scope of practice.     White board updated.     Patient left up in chair with all lines intact, call button in reach, RN notified and daughter present.    GOALS:   Multidisciplinary Problems     Physical Therapy Goals        Problem: Physical Therapy Goal    Goal Priority Disciplines Outcome Goal Variances Interventions   Physical Therapy Goal     PT, PT/OT Ongoing (interventions implemented as appropriate)     Description:  Goals to be met by: 19     Patient will increase functional independence with mobility by performin. Supine to sit with Modified [place]  2. Sit to stand transfer with Supervision  3. Gait  x 200 feet with Supervision with or without using least restrictive AD.   4. Ascend/descend 3 stairs with handrail as needed with SBA   5. Lower extremity exercise program x20 reps per handout, with independence                        History:     Past Medical History:   Diagnosis Date    Cancer 2018    Lung    Diabetes mellitus, type 2     Steriod induced, years ago, now glucose up and down    GERD (gastroesophageal reflux disease)     Hyperlipidemia     Sleep apnea        Past Surgical History:   Procedure Laterality Date    BONE BIOPSY  2018    Sacral bone    BREAST BIOPSY Right 2014    BREAST LUMPECTOMY Right     BREAST LUMPECTOMY Right 2014    CARPAL TUNNEL RELEASE       SECTION      CHOLECYSTECTOMY      DILATION AND CURETTAGE OF UTERUS  1987    ENDOMETRIAL ABLATION  02/15/2011    HAND SURGERY Left 2007    HAND SURGERY Right 2008    HEMORRHOID SURGERY  2010    LUNG BIOPSY Right 2018    MOUTH SURGERY   and 1982    cyst removal    SALIVARY GLAND SURGERY Left 2018    Biopsy    THROAT SURGERY  10/1983     Remove vocal cord nodule    TONSILLECTOMY  1969    TUNNELED VENOUS PORT PLACEMENT  04/18/2018       Time Tracking:     PT Received On: 09/10/19  PT Start Time: 1335     PT Stop Time: 1359  PT Total Time (min): 24 min     Billable Minutes: Evaluation 24 min    Judy Norris, PT  09/10/2019

## 2019-09-11 LAB
ALBUMIN SERPL BCP-MCNC: 3 G/DL (ref 3.5–5.2)
ALP SERPL-CCNC: 99 U/L (ref 55–135)
ALT SERPL W/O P-5'-P-CCNC: 12 U/L (ref 10–44)
ANION GAP SERPL CALC-SCNC: 10 MMOL/L (ref 8–16)
AST SERPL-CCNC: 19 U/L (ref 10–40)
BASOPHILS # BLD AUTO: 0.01 K/UL (ref 0–0.2)
BASOPHILS NFR BLD: 0.6 % (ref 0–1.9)
BILIRUB SERPL-MCNC: 0.3 MG/DL (ref 0.1–1)
BUN SERPL-MCNC: 10 MG/DL (ref 6–20)
CALCIUM SERPL-MCNC: 9 MG/DL (ref 8.7–10.5)
CHLORIDE SERPL-SCNC: 107 MMOL/L (ref 95–110)
CO2 SERPL-SCNC: 24 MMOL/L (ref 23–29)
CREAT SERPL-MCNC: 0.6 MG/DL (ref 0.5–1.4)
DIFFERENTIAL METHOD: ABNORMAL
EOSINOPHIL # BLD AUTO: 0.1 K/UL (ref 0–0.5)
EOSINOPHIL NFR BLD: 2.9 % (ref 0–8)
ERYTHROCYTE [DISTWIDTH] IN BLOOD BY AUTOMATED COUNT: 15.6 % (ref 11.5–14.5)
EST. GFR  (AFRICAN AMERICAN): >60 ML/MIN/1.73 M^2
EST. GFR  (NON AFRICAN AMERICAN): >60 ML/MIN/1.73 M^2
GLUCOSE SERPL-MCNC: 84 MG/DL (ref 70–110)
HCT VFR BLD AUTO: 27 % (ref 37–48.5)
HGB BLD-MCNC: 9 G/DL (ref 12–16)
IMM GRANULOCYTES # BLD AUTO: 0 K/UL (ref 0–0.04)
IMM GRANULOCYTES NFR BLD AUTO: 0 % (ref 0–0.5)
LYMPHOCYTES # BLD AUTO: 0.7 K/UL (ref 1–4.8)
LYMPHOCYTES NFR BLD: 40.6 % (ref 18–48)
MAGNESIUM SERPL-MCNC: 1.7 MG/DL (ref 1.6–2.6)
MCH RBC QN AUTO: 34.4 PG (ref 27–31)
MCHC RBC AUTO-ENTMCNC: 33.3 G/DL (ref 32–36)
MCV RBC AUTO: 103 FL (ref 82–98)
MONOCYTES # BLD AUTO: 0.3 K/UL (ref 0.3–1)
MONOCYTES NFR BLD: 17.6 % (ref 4–15)
NEUTROPHILS # BLD AUTO: 0.7 K/UL (ref 1.8–7.7)
NEUTROPHILS NFR BLD: 38.3 % (ref 38–73)
NRBC BLD-RTO: 0 /100 WBC
PHOSPHATE SERPL-MCNC: 3.6 MG/DL (ref 2.7–4.5)
PLATELET # BLD AUTO: 37 K/UL (ref 150–350)
PMV BLD AUTO: 12.3 FL (ref 9.2–12.9)
POCT GLUCOSE: 110 MG/DL (ref 70–110)
POCT GLUCOSE: 82 MG/DL (ref 70–110)
POCT GLUCOSE: 90 MG/DL (ref 70–110)
POCT GLUCOSE: 90 MG/DL (ref 70–110)
POCT GLUCOSE: 96 MG/DL (ref 70–110)
POTASSIUM SERPL-SCNC: 3.9 MMOL/L (ref 3.5–5.1)
PROT SERPL-MCNC: 6.2 G/DL (ref 6–8.4)
RBC # BLD AUTO: 2.62 M/UL (ref 4–5.4)
SODIUM SERPL-SCNC: 141 MMOL/L (ref 136–145)
WBC # BLD AUTO: 1.7 K/UL (ref 3.9–12.7)

## 2019-09-11 PROCEDURE — 99233 PR SUBSEQUENT HOSPITAL CARE,LEVL III: ICD-10-PCS | Mod: ,,, | Performed by: PSYCHIATRY & NEUROLOGY

## 2019-09-11 PROCEDURE — 80053 COMPREHEN METABOLIC PANEL: CPT

## 2019-09-11 PROCEDURE — 99232 PR SUBSEQUENT HOSPITAL CARE,LEVL II: ICD-10-PCS | Mod: ,,, | Performed by: NURSE PRACTITIONER

## 2019-09-11 PROCEDURE — 25000003 PHARM REV CODE 250: Performed by: PSYCHIATRY & NEUROLOGY

## 2019-09-11 PROCEDURE — 84100 ASSAY OF PHOSPHORUS: CPT

## 2019-09-11 PROCEDURE — 99233 SBSQ HOSP IP/OBS HIGH 50: CPT | Mod: ,,, | Performed by: PSYCHIATRY & NEUROLOGY

## 2019-09-11 PROCEDURE — 20600001 HC STEP DOWN PRIVATE ROOM

## 2019-09-11 PROCEDURE — 97535 SELF CARE MNGMENT TRAINING: CPT

## 2019-09-11 PROCEDURE — 92526 ORAL FUNCTION THERAPY: CPT

## 2019-09-11 PROCEDURE — 99232 SBSQ HOSP IP/OBS MODERATE 35: CPT | Mod: ,,, | Performed by: NURSE PRACTITIONER

## 2019-09-11 PROCEDURE — 25000003 PHARM REV CODE 250: Performed by: NURSE PRACTITIONER

## 2019-09-11 PROCEDURE — 83735 ASSAY OF MAGNESIUM: CPT

## 2019-09-11 PROCEDURE — 85025 COMPLETE CBC W/AUTO DIFF WBC: CPT

## 2019-09-11 PROCEDURE — 94761 N-INVAS EAR/PLS OXIMETRY MLT: CPT

## 2019-09-11 RX ADMIN — OXYCODONE HYDROCHLORIDE 5 MG: 5 TABLET ORAL at 08:09

## 2019-09-11 RX ADMIN — PANTOPRAZOLE SODIUM 40 MG: 40 TABLET, DELAYED RELEASE ORAL at 08:09

## 2019-09-11 RX ADMIN — LEVOTHYROXINE SODIUM 25 MCG: 25 TABLET ORAL at 06:09

## 2019-09-11 RX ADMIN — ATORVASTATIN CALCIUM 40 MG: 20 TABLET, FILM COATED ORAL at 08:09

## 2019-09-11 RX ADMIN — OXYCODONE HYDROCHLORIDE 5 MG: 5 TABLET ORAL at 11:09

## 2019-09-11 RX ADMIN — OXYCODONE HYDROCHLORIDE 5 MG: 5 TABLET ORAL at 07:09

## 2019-09-11 RX ADMIN — POLYETHYLENE GLYCOL 3350 17 G: 17 POWDER, FOR SOLUTION ORAL at 08:09

## 2019-09-11 RX ADMIN — OXYCODONE HYDROCHLORIDE 5 MG: 5 TABLET ORAL at 02:09

## 2019-09-11 RX ADMIN — OXYCODONE HYDROCHLORIDE 5 MG: 5 TABLET ORAL at 03:09

## 2019-09-11 NOTE — SUBJECTIVE & OBJECTIVE
Interval History:  Patient is comfortable this AM. She is stable and responding to questions without issue. Daughter is at bedside. Received 1U PLT yesterday.     Review of Systems   Constitutional: Positive for activity change and fatigue. Negative for fever.   HENT: Negative for trouble swallowing and voice change.    Eyes: Negative for photophobia and visual disturbance.   Respiratory: Negative for cough and shortness of breath.    Cardiovascular: Negative for chest pain and palpitations.   Gastrointestinal: Negative for nausea and vomiting.   Genitourinary: Negative for difficulty urinating and flank pain.   Musculoskeletal: Positive for gait problem. Negative for arthralgias.   Skin: Negative for color change and rash.   Neurological: Positive for weakness and numbness. Negative for facial asymmetry and headaches.   Psychiatric/Behavioral: Negative for agitation and confusion.     Objective:     Vitals:  Temp: 98.4 °F (36.9 °C)  Pulse: 91  Rhythm: normal sinus rhythm  BP: 118/71  MAP (mmHg): 90  Resp: (!) 23  SpO2: 97 %  O2 Device (Oxygen Therapy): room air    Temp  Min: 98.3 °F (36.8 °C)  Max: 98.4 °F (36.9 °C)  Pulse  Min: 68  Max: 106  BP  Min: 97/54  Max: 153/84  MAP (mmHg)  Min: 70  Max: 113  Resp  Min: 10  Max: 43  SpO2  Min: 93 %  Max: 99 %    09/10 0701 - 09/11 0700  In: 256.3 [P.O.:200]  Out: -    Unmeasured Output  Urine Occurrence: 1  Stool Occurrence: 0       Physical Exam   Constitutional: She is oriented to person, place, and time. She appears well-developed and well-nourished. She appears ill.   HENT:   Head: Normocephalic and atraumatic.   Eyes: Right eye exhibits no discharge. Left eye exhibits no discharge.   Neck: Neck supple.   Cardiovascular: Normal rate and intact distal pulses.   Pulmonary/Chest: Effort normal. No respiratory distress.   Abdominal: Soft. There is no tenderness.   Musculoskeletal: She exhibits no edema or deformity.   L sided weakness    Neurological: She is alert and  oriented to person, place, and time. A sensory deficit (L side) is present. She exhibits normal muscle tone.   Follows commands    Skin: Skin is warm and dry.   Psychiatric: She has a normal mood and affect. Her behavior is normal. Cognition and memory are not impaired.   Vitals reviewed.      Medications:  Continuous Scheduled  atorvastatin 40 mg Daily   levothyroxine 25 mcg Before breakfast   pantoprazole 40 mg Daily   polyethylene glycol 17 g Daily   PRN  sodium chloride  Q24H PRN   acetaminophen 650 mg Q6H PRN   oxyCODONE 5 mg Q4H PRN   sodium chloride 0.9% 10 mL PRN   sodium chloride 0.9% 10 mL PRN     Today I personally reviewed pertinent medications, laboratory results, notably:    Diet  Diet Dysphagia Mechanical Soft (IDDSI Level 5) Thin  Diet Dysphagia Mechanical Soft (IDDSI Level 5) Thin

## 2019-09-11 NOTE — PROGRESS NOTES
[facility]  Vascular Neurology  Comprehensive Stroke Center  Progress Note    Assessment/Plan:     * Acute ischemic right MCA stroke  53 y.o woman with a medical history of small cell lung carcinoma (currently receiving chemo and radiation with bony metastasis), HTN, DM2, HLD, current every day smoker and BASSAM presented to [facility] with sudden onset of facial droop and left sided plegia, NIHSS 16, VAN (+), GCS 15. Decision was made to give tPA at  and transfer to [facility] for thrombectomy. Upon arrival CTA multiphase showed right M2 occlusion. Decision was made to do thrombectomy. Will be admitted to [facility] post thrombectomy. Possible etiology embolic 2/2 her current risk factors as well as hypercoagulable from her cancer.  MRI showed petechial hemorrhage however do not see increase functional deficit.      Antithrombotics for secondary stroke prevention: Antiplatelets: None: Intracerebral Hemorrhage wait until 50K platelet to restart to ac. Will require follow up CT to follow up with bleeding.     Statins for secondary stroke prevention and hyperlipidemia, if present:   Statins: Atorvastatin- 80 mg daily    Aggressive risk factor modification: HTN, Smoking, DM, HLD, lung cancer     Rehab efforts: The patient has been evaluated by a stroke team provider and the therapy needs have been fully considered based off the presenting complaints and exam findings. The following therapy evaluations are needed: None Outpatient PT OT    Diagnostics ordered/pending: None     VTE prophylaxis: None: Reason for No Pharmacological VTE Prophylaxis: History of systemic or intracranial bleeding, Mechanical prophylaxis: Place SCDs,      BP parameters: Infarct: Post sucessful thrombectomy, SBP <140        Diabetes mellitus type II, controlled  Unclear which medication patient takes for diabetes. In the hospital place patient on sliding scale.     HLD (hyperlipidemia)  Atorvastatin 80 mg    Lung cancer metastatic to  bone  Currently on chemotherapy and radiation    Tobacco abuse  Current every day smoker. Nicotine patch          9/9/19: MRI showed concerning for petechial hemorrhagic conversion    STROKE DOCUMENTATION   Acute Stroke Times   Last Known Normal Date: 09/08/19  Last Known Normal Time: 1230  Symptom Onset Date: 09/08/19  Symptom Onset Time: 1230  Stroke Team Called Date: 09/08/19  Stroke Team Called Time: 1500  Stroke Team Arrival Date: 09/08/19  Stroke Team Arrival Time: 1500  CT Interpretation Time: 1217  Decision to Treat Time for Alteplase: 1326    NIH Scale:  1a. Level of Consciousness: 0-->Alert, keenly responsive  1b. LOC Questions: 0-->Answers both questions correctly  1c. LOC Commands: 0-->Performs both tasks correctly  2. Best Gaze: 0-->Normal  3. Visual: 0-->No visual loss  4. Facial Palsy: 0-->Normal symmetrical movements  5a. Motor Arm, Left: 0-->No drift, limb holds 90 (or 45) degrees for full 10 secs  5b. Motor Arm, Right: 0-->No drift, limb holds 90 (or 45) degrees for full 10 secs  6a. Motor Leg, Left: 0-->No drift, leg holds 30 degree position for full 5 secs  6b. Motor Leg, Right: 0-->No drift, leg holds 30 degree position for full 5 secs  7. Limb Ataxia: 0-->Absent  8. Sensory: 1-->Mild-to-moderate sensory loss, patient feels pinprick is less sharp or is dull on the affected side, or there is a loss of superficial pain with pinprick, but patient is aware of being touched  9. Best Language: 0-->No aphasia, normal  10. Dysarthria: 0-->Normal  11. Extinction and Inattention (formerly Neglect): 0-->No abnormality  Total (NIH Stroke Scale): 1       Modified [place] Coma Scale:15   ABCD2 Score:    [id number] Score:   HAS -BLED Score:   ICH Score:   Hunt & Dewey Classification:      Hemorrhagic change of an Ischemic Stroke: Does this patient have an ischemic stroke with hemorrhagic changes? No     Neurologic Chief Complaint: Right extremity weakness and right facial droop     Subjective:      Interval History: Patient is seen for follow-up neurological assessment and treatment recommendations: Patient feeling well and her sensation is returning slowly on her left arm. Patient is encouraged by the sign and does report headache and new CT scan shows bleed in the head without any physical symptoms. Stopped Asprin.     HPI, Past Medical, Family, and Social History remains the same as documented in the initial encounter.     Review of Systems   Constitutional: Negative for chills, diaphoresis, fatigue and fever.   Eyes: Negative for photophobia and visual disturbance.   Cardiovascular: Negative for chest pain, palpitations and leg swelling.   Musculoskeletal: Negative for arthralgias, back pain, gait problem, joint swelling, myalgias, neck pain and neck stiffness.   Skin: Negative for color change, pallor and rash.   Neurological: Positive for weakness and headaches. Negative for dizziness, tremors, seizures, syncope, facial asymmetry, speech difficulty, light-headedness and numbness.   Psychiatric/Behavioral: Negative for agitation, confusion and decreased concentration.     Scheduled Meds:   atorvastatin  40 mg Oral Daily    levothyroxine  25 mcg Oral Before breakfast    pantoprazole  40 mg Oral Daily    polyethylene glycol  17 g Oral Daily     Continuous Infusions:  PRN Meds:sodium chloride, acetaminophen, oxyCODONE, sodium chloride 0.9%, sodium chloride 0.9%    Objective:     Vital Signs (Most Recent):  Temp: 98.4 °F (36.9 °C) (09/11/19 0705)  Pulse: 91 (09/11/19 1005)  Resp: (!) 23 (09/11/19 1005)  BP: 118/71 (09/11/19 1005)  SpO2: 97 % (09/11/19 1005)  BP Location: Left arm    Vital Signs Range (Last 24H):  Temp:  [98.3 °F (36.8 °C)-98.4 °F (36.9 °C)]   Pulse:  []   Resp:  [10-43]   BP: ()/(54-92)   SpO2:  [93 %-99 %]   BP Location: Left arm    Physical Exam   Constitutional: She is oriented to person, place, and time. She appears well-developed and well-nourished.   HENT:   Head:  Normocephalic.   Loss of hair   Eyes: Pupils are equal, round, and reactive to light.   Neck: Normal range of motion.   Cardiovascular: Normal rate, regular rhythm and normal heart sounds.   Pulmonary/Chest: Effort normal and breath sounds normal.   Abdominal: Soft. Bowel sounds are normal.   Neurological: She is alert and oriented to person, place, and time. A sensory deficit is present. No cranial nerve deficit. She exhibits normal muscle tone. Coordination normal.   Skin: She is not diaphoretic.   Psychiatric: She has a normal mood and affect. Her behavior is normal.       Neurological Exam:   LOC: alert  Attention Span: Good   Language: No aphasia  Articulation: No dysarthria  Orientation: Person, Place, Time   Visual Fields: Full  EOM (CN III, IV, VI): Full/intact  Pupils (CN II, III): PERRL  Facial Sensation (CN V): Normal  Facial Movement (CN VII): Symmetric facial expression    Motor: Arm left  Paresis: 3/5  Leg left  Paresis: 4/5  Arm right  Normal 5/5  Leg right Normal 5/5  Cebellar: No evidence of appendicular or axial ataxia  Sensation: Intact to light touch, temperature and vibration    Laboratory:  BMP:   Recent Labs   Lab 09/11/19  0036      K 3.9      CO2 24   BUN 10   CREATININE 0.6   CALCIUM 9.0     CBC:   Recent Labs   Lab 09/11/19  0220   WBC 1.70*   RBC 2.62*   HGB 9.0*   HCT 27.0*   PLT 37*   *   MCH 34.4*   MCHC 33.3     Lipid Panel:   Recent Labs   Lab 09/08/19  1728   CHOL 181   LDLCALC 107.6   HDL 37*   TRIG 182*     Coagulation:   Recent Labs   Lab 09/08/19  1313   INR 0.9     Hgb A1C:   Recent Labs   Lab 09/08/19  1728   HGBA1C 4.5       Diagnostic Results     Brain Imaging   MRI: Mild moderate region of right MCA distribution acute/recent infarction with petechial susceptibility in the parietal lobe components concerning for petechial hemorrhagic conversion.    There is localized mass effect without midline shift or hydrocephalus.  Scattered patchy and confluent foci  of T2 FLAIR signal hyperintensity elsewhere supratentorial white matter with ill-defined component in the maura while nonspecific concerning for mild moderate underlying chronic ischemic change.    CTH 9/10:Evolving right MCA distribution infarct as above.    Small volume of subarachnoid hemorrhage, centered in the right sylvian fissure.       Indeterminate 2.1 cm left parotid mass, possible pleomorphic adenoma.  Vessel Imaging   Right central veins: The internal jugular, subclavian, and axillary veins are patent and free of thrombus.  Right arm veins: The brachial, basilic, and cephalic veins are patent and compressible.    Left central veins: The internal jugular, subclavian, and axillary veins are patent and free of thrombus.    Left arm veins: The brachial, basilic, and cephalic veins are patent and compressible.CTA: Abrupt occlusion of the lateral M2 segment of the right MCA.      No significant parenchymal hypoattenuation, edema/ mass effect, or hemorrhage.    Stenosis of the distal right vertebral artery.    Partially imaged right suprahilar/ apical lung mass, correlating with patient's history of lung cancer.    Cardiac Imaging   · Concentric left ventricular remodeling.  · Normal left ventricular systolic function. The estimated ejection fraction is 65%  · Mild right ventricular enlargement.  · Normal right ventricular systolic function.  · Normal LV diastolic function.  · Normal central venous pressure (3 mm Hg).  · No wall motion abnormalities.  · No evidence of intracardiac shunting.           TAL Hurtado MD  Comprehensive Stroke Center  Department of Vascular Neurology   [facility]

## 2019-09-11 NOTE — NURSING
Pt received from nicu, no distress noted, fall precautions in place. VSS. Will continue to monitor.

## 2019-09-11 NOTE — ASSESSMENT & PLAN NOTE
PLTs 37 from 31 s/p 1U PLTs   Given tPA 1326  Concern hypercoagulable state 2/2 to SCLC w/ mets   Trend CBC with diff

## 2019-09-11 NOTE — PLAN OF CARE
Problem: SLP Goal  Goal: SLP Goal  Speech Language Pathology Goals  Goals expected to be met by 9/16  1. Pt will tolerate dental soft diet with thin liquid and no overt s/s of aspiration.   2. Pt will perform oral motor exercises x10.  3. Pt will participate in further assessment of speech/language/cognitive skills post CVA. -goal met          Pt with good participation in ST session.  Progressing toward goals.  Full session note to follow.       WHITLEY Marmolejo, CCC-SLP  Speech Language Pathologist  [phone]  9/11/2019

## 2019-09-11 NOTE — PLAN OF CARE
Problem: Adult Inpatient Plan of Care  Goal: Plan of Care Review  POC reviewed with pt and family at 0400. Pt and family verbalized understanding. Questions and concerns addressed. No acute events overnight. Pt progressing toward goals. Will continue to monitor. See flowsheets for full assessment and VS info

## 2019-09-11 NOTE — PLAN OF CARE
09/11/19 1638   Post-Acute Status   Post-Acute Authorization Other   Post-Acute Placement Status Awaiting Internal Medical Clearance   Other Status See Comments  (Therapy recommending outpatient)   Discharge Delays None known at this time       Lluvia Brewer RN, CCRN-K, [facility]  Neuro-Critical Care   [phone]

## 2019-09-11 NOTE — PT/OT/SLP PROGRESS
Speech Language Pathology Treatment    Patient Name:  Beatriz Valente   MRN:  [id number]  Admitting Diagnosis: Acute ischemic right MCA stroke    Recommendations:                 General Recommendations:  Dysphagia therapy  Diet recommendations:  Dental Soft, Liquid Diet Level: Thin   Aspiration Precautions: 90 degrees for all oral intake; small bites/sips; alternate bites/sips; watch for pocketing; aspiration precautions.   General Precautions: Standard, aspiration, fall  Communication strategies:  none    Subjective   Pt seen this am with daughter and  present.  Ms. Valente was alert and cooperative.    Pain/Comfort:  · Pain Rating 1: 0/10  · Pain Rating Post-Intervention 1: 0/10    Objective:     Has the patient been evaluated by SLP for swallowing?   Yes  Keep patient NPO? No   Current Respiratory Status: room air      Pt reported tolerance of current diet with only occasional report of mild anterior loss/dripping of liquid from left. Pt preference is for using a straw as result.  Ms. Valente was seated upright and observed with thin liquid via straw and cracker boluses.  Oral phase was functional with slightly increased time for mastication.  Strategies to reduce risk for pocketing were reviewed.  No overt s/s of aspiration were observed.  Discussed diet options (dental soft vs regular).  Will continue dental soft diet at this time with ongoing monitoring for appropriateness of upgrade.  Pt completed OME's x10 for mild left lingual/labial weakness.  Encouraged independent practice of exercises several times a day.  Educated pt on benefit of completing exercises with visual feedback of mirror/camera feature on phone. Speech/language/cognition all continue to appear functional.      Assessment:     Beatriz Valente is a 53 y.o. female with an SLP diagnosis of mild oral motor weakness / mild Dysphagia.      Goals:   Multidisciplinary Problems     SLP Goals        Problem: SLP Goal    Goal Priority Disciplines  Outcome   SLP Goal     SLP Ongoing (interventions implemented as appropriate)   Description:  Speech Language Pathology Goals  Goals expected to be met by 9/16  1. Pt will tolerate dental soft diet with thin liquid and no overt s/s of aspiration.   2. Pt will perform oral motor exercises x10.  3. Pt will participate in further assessment of speech/language/cognitive skills post CVA. -goal met                         Plan:     · Patient to be seen:  3 x/week   · Plan of Care expires:  10/08/19  · Plan of Care reviewed with:  patient, daughter, spouse   · SLP Follow-Up:  Yes       Discharge recommendations:  (likely will not warrant continued ST upon discharge)     Time Tracking:     SLP Treatment Date:   09/11/19  Speech Start Time:  1032  Speech Stop Time:  1052     Speech Total Time (min):  20 min    Billable Minutes: Treatment Swallowing Dysfunction 10 and Seld Care/Home Management Training 10    WHITLEY Marmolejo, CCC-SLP  Speech Language Pathologist  [phone]  9/11/2019

## 2019-09-11 NOTE — SUBJECTIVE & OBJECTIVE
Interval History 9/11/2019:  Patient is seen for follow-up rehab evaluation and recommendations: Evaluated by therapy.    HPI, Past Medical, Family, and Social History remains the same as documented in the initial encounter.    Scheduled Medications:    atorvastatin  40 mg Oral Daily    levothyroxine  25 mcg Oral Before breakfast    pantoprazole  40 mg Oral Daily    polyethylene glycol  17 g Oral Daily       Diagnostic Results: Labs: Reviewed    PRN Medications: sodium chloride, acetaminophen, oxyCODONE, sodium chloride 0.9%, sodium chloride 0.9%    Review of Systems   Reason unable to perform ROS: somnolence.     Objective:     Vital Signs (Most Recent):  Temp: 98.4 °F (36.9 °C) (09/11/19 0705)  Pulse: 91 (09/11/19 1005)  Resp: (!) 23 (09/11/19 1005)  BP: 118/71 (09/11/19 1005)  SpO2: 97 % (09/11/19 1005)    Vital Signs (24h Range):  Temp:  [98.3 °F (36.8 °C)-98.4 °F (36.9 °C)] 98.4 °F (36.9 °C)  Pulse:  [] 91  Resp:  [10-43] 23  SpO2:  [93 %-99 %] 97 %  BP: ()/(54-92) 118/71     Physical Exam   Constitutional: She appears well-developed and well-nourished. She is sleeping. She is easily aroused. She appears ill.   On bipap    HENT:   Head: Normocephalic and atraumatic.   Eyes: Right eye exhibits no discharge. Left eye exhibits no discharge.   Neck: Neck supple.   Cardiovascular: Normal rate and intact distal pulses.   Pulmonary/Chest: Effort normal. No respiratory distress.   Abdominal: Soft. There is no tenderness.   Musculoskeletal: She exhibits no edema or deformity.   L sided weakness    Neurological: She is easily aroused. Sensory deficit: L side.   Sleeping, easily arousable  Follows commands    Skin: Skin is warm and dry.   Psychiatric: She has a normal mood and affect. Her behavior is normal. Cognition and memory are not impaired.   Vitals reviewed.

## 2019-09-11 NOTE — NURSING TRANSFER
Nursing Transfer Note      9/11/2019     Transferred from 9082 to [id number]     Transfer via bed    Transfer with cardiac monitoring    Transported by ADRIAN Johnson    Medicines sent: None    Chart send with patient: Yes    Notified: spouse    Patient reassessed at: 7869 9/11/19    Upon arrival to floor: cardiac monitor applied, patient oriented to room, call bell in reach and bed in lowest position

## 2019-09-11 NOTE — SUBJECTIVE & OBJECTIVE
Neurologic Chief Complaint: Right extremity weakness and right facial droop     Subjective:     Interval History: Patient is seen for follow-up neurological assessment and treatment recommendations: Patient feeling well and her sensation is returning slowly on her left arm. Patient is encouraged by the sign and does report headache and new CT scan shows bleed in the head without any physical symptoms. Stopped Asprin.     HPI, Past Medical, Family, and Social History remains the same as documented in the initial encounter.     Review of Systems   Constitutional: Negative for chills, diaphoresis, fatigue and fever.   Eyes: Negative for photophobia and visual disturbance.   Cardiovascular: Negative for chest pain, palpitations and leg swelling.   Musculoskeletal: Negative for arthralgias, back pain, gait problem, joint swelling, myalgias, neck pain and neck stiffness.   Skin: Negative for color change, pallor and rash.   Neurological: Positive for weakness and headaches. Negative for dizziness, tremors, seizures, syncope, facial asymmetry, speech difficulty, light-headedness and numbness.   Psychiatric/Behavioral: Negative for agitation, confusion and decreased concentration.     Scheduled Meds:   atorvastatin  40 mg Oral Daily    levothyroxine  25 mcg Oral Before breakfast    pantoprazole  40 mg Oral Daily    polyethylene glycol  17 g Oral Daily     Continuous Infusions:  PRN Meds:sodium chloride, acetaminophen, oxyCODONE, sodium chloride 0.9%, sodium chloride 0.9%    Objective:     Vital Signs (Most Recent):  Temp: 98.4 °F (36.9 °C) (09/11/19 0705)  Pulse: 91 (09/11/19 1005)  Resp: (!) 23 (09/11/19 1005)  BP: 118/71 (09/11/19 1005)  SpO2: 97 % (09/11/19 1005)  BP Location: Left arm    Vital Signs Range (Last 24H):  Temp:  [98.3 °F (36.8 °C)-98.4 °F (36.9 °C)]   Pulse:  []   Resp:  [10-43]   BP: ()/(54-92)   SpO2:  [93 %-99 %]   BP Location: Left arm    Physical Exam   Constitutional: She is oriented  to person, place, and time. She appears well-developed and well-nourished.   HENT:   Head: Normocephalic.   Loss of hair   Eyes: Pupils are equal, round, and reactive to light.   Neck: Normal range of motion.   Cardiovascular: Normal rate, regular rhythm and normal heart sounds.   Pulmonary/Chest: Effort normal and breath sounds normal.   Abdominal: Soft. Bowel sounds are normal.   Neurological: She is alert and oriented to person, place, and time. A sensory deficit is present. No cranial nerve deficit. She exhibits normal muscle tone. Coordination normal.   Skin: She is not diaphoretic.   Psychiatric: She has a normal mood and affect. Her behavior is normal.       Neurological Exam:   LOC: alert  Attention Span: Good   Language: No aphasia  Articulation: No dysarthria  Orientation: Person, Place, Time   Visual Fields: Full  EOM (CN III, IV, VI): Full/intact  Pupils (CN II, III): PERRL  Facial Sensation (CN V): Normal  Facial Movement (CN VII): Symmetric facial expression    Motor: Arm left  Paresis: 3/5  Leg left  Paresis: 4/5  Arm right  Normal 5/5  Leg right Normal 5/5  Cebellar: No evidence of appendicular or axial ataxia  Sensation: Intact to light touch, temperature and vibration    Laboratory:  BMP:   Recent Labs   Lab 09/11/19  0036      K 3.9      CO2 24   BUN 10   CREATININE 0.6   CALCIUM 9.0     CBC:   Recent Labs   Lab 09/11/19  0220   WBC 1.70*   RBC 2.62*   HGB 9.0*   HCT 27.0*   PLT 37*   *   MCH 34.4*   MCHC 33.3     Lipid Panel:   Recent Labs   Lab 09/08/19  1728   CHOL 181   LDLCALC 107.6   HDL 37*   TRIG 182*     Coagulation:   Recent Labs   Lab 09/08/19  1313   INR 0.9     Hgb A1C:   Recent Labs   Lab 09/08/19  1728   HGBA1C 4.5       Diagnostic Results     Brain Imaging   MRI: Mild moderate region of right MCA distribution acute/recent infarction with petechial susceptibility in the parietal lobe components concerning for petechial hemorrhagic conversion.    There is localized  mass effect without midline shift or hydrocephalus.  Scattered patchy and confluent foci of T2 FLAIR signal hyperintensity elsewhere supratentorial white matter with ill-defined component in the maura while nonspecific concerning for mild moderate underlying chronic ischemic change.    CTH 9/10:Evolving right MCA distribution infarct as above.    Small volume of subarachnoid hemorrhage, centered in the right sylvian fissure.       Indeterminate 2.1 cm left parotid mass, possible pleomorphic adenoma.  Vessel Imaging   Right central veins: The internal jugular, subclavian, and axillary veins are patent and free of thrombus.  Right arm veins: The brachial, basilic, and cephalic veins are patent and compressible.    Left central veins: The internal jugular, subclavian, and axillary veins are patent and free of thrombus.    Left arm veins: The brachial, basilic, and cephalic veins are patent and compressible.CTA: Abrupt occlusion of the lateral M2 segment of the right MCA.      No significant parenchymal hypoattenuation, edema/ mass effect, or hemorrhage.    Stenosis of the distal right vertebral artery.    Partially imaged right suprahilar/ apical lung mass, correlating with patient's history of lung cancer.    Cardiac Imaging   · Concentric left ventricular remodeling.  · Normal left ventricular systolic function. The estimated ejection fraction is 65%  · Mild right ventricular enlargement.  · Normal right ventricular systolic function.  · Normal LV diastolic function.  · Normal central venous pressure (3 mm Hg).  · No wall motion abnormalities.  · No evidence of intracardiac shunting.

## 2019-09-11 NOTE — PROGRESS NOTES
[facility]  Neurocritical Care  Progress Note    Admit Date: 9/8/2019  Service Date: 09/11/2019  Length of Stay: 3    Subjective:     Chief Complaint: Acute ischemic right MCA stroke    History of Present Illness: Pt is a 52 yo female with a PMHx of HTN, HLD, DM II, BASSAM, and SCLC with mets seen on last MRI (8/19) presents from OSH to [facility] for RMCA syndrome.  Pt was with her daughter in [place] when the pt acutely presented with left-sided weakness, facial droop, and slurred speech. Telestroke was consulted. Pt presented with an NIHSS 16, VAN (+), GCS (15). The risk of  Increased hemorrhage and tPA administration to pt with cancer were presented and discussed. Pt and family decided to receive tPA (decision to treat [id number]) treatment and transfe to [facility] for higher level of care with potential intervention. At [facility], imaging was performed and pt was found to have a R M2 occlusion. Pt was taken for IR for thrombectomy. Pt admitted to [facility] for higher of care and monitoring post procedure.     Pt history was obtained per chart review.      Hospital Course: 09/08/2019 tPA 1326 OSH, transfer to [facility], pt taken for IR, admit to [facility]   09/11/2019: PLTs at 31, went up to 37 after 1 U PLT. Patient is otherwise stable on exam and able to step down.         Interval History:  Patient is comfortable this AM. She is stable and responding to questions without issue. Daughter is at bedside. Received 1U PLT yesterday.     Review of Systems   Constitutional: Positive for activity change and fatigue. Negative for fever.   HENT: Negative for trouble swallowing and voice change.    Eyes: Negative for photophobia and visual disturbance.   Respiratory: Negative for cough and shortness of breath.    Cardiovascular: Negative for chest pain and palpitations.   Gastrointestinal: Negative for nausea and vomiting.   Genitourinary: Negative for difficulty urinating and flank pain.   Musculoskeletal: Positive for gait problem.  Negative for arthralgias.   Skin: Negative for color change and rash.   Neurological: Positive for weakness and numbness. Negative for facial asymmetry and headaches.   Psychiatric/Behavioral: Negative for agitation and confusion.     Objective:     Vitals:  Temp: 98.4 °F (36.9 °C)  Pulse: 91  Rhythm: normal sinus rhythm  BP: 118/71  MAP (mmHg): 90  Resp: (!) 23  SpO2: 97 %  O2 Device (Oxygen Therapy): room air    Temp  Min: 98.3 °F (36.8 °C)  Max: 98.4 °F (36.9 °C)  Pulse  Min: 68  Max: 106  BP  Min: 97/54  Max: 153/84  MAP (mmHg)  Min: 70  Max: 113  Resp  Min: 10  Max: 43  SpO2  Min: 93 %  Max: 99 %    09/10 0701 - 09/11 0700  In: 256.3 [P.O.:200]  Out: -    Unmeasured Output  Urine Occurrence: 1  Stool Occurrence: 0       Physical Exam   Constitutional: She is oriented to person, place, and time. She appears well-developed and well-nourished. She appears ill.   HENT:   Head: Normocephalic and atraumatic.   Eyes: Right eye exhibits no discharge. Left eye exhibits no discharge.   Neck: Neck supple.   Cardiovascular: Normal rate and intact distal pulses.   Pulmonary/Chest: Effort normal. No respiratory distress.   Abdominal: Soft. There is no tenderness.   Musculoskeletal: She exhibits no edema or deformity.   L sided weakness    Neurological: She is alert and oriented to person, place, and time. A sensory deficit (L side) is present. She exhibits normal muscle tone.   Follows commands    Skin: Skin is warm and dry.   Psychiatric: She has a normal mood and affect. Her behavior is normal. Cognition and memory are not impaired.   Vitals reviewed.      Medications:  Continuous Scheduled  atorvastatin 40 mg Daily   levothyroxine 25 mcg Before breakfast   pantoprazole 40 mg Daily   polyethylene glycol 17 g Daily   PRN  sodium chloride  Q24H PRN   acetaminophen 650 mg Q6H PRN   oxyCODONE 5 mg Q4H PRN   sodium chloride 0.9% 10 mL PRN   sodium chloride 0.9% 10 mL PRN     Today I personally reviewed pertinent medications,  laboratory results, notably:    Diet  Diet Dysphagia Mechanical Soft (IDDSI Level 5) Thin  Diet Dysphagia Mechanical Soft (IDDSI Level 5) Thin      Assessment/Plan:     Neuro  * Acute ischemic right MCA stroke  53 pt was in [place] when she presented with acute LSW, L facial droop and slurred speech. Pt was given tPA 1326 and transferred to [facility] for IR and intervention.     -admitted to [facility]   - neurology following   -IR following, pt taken for IR procedure for reperfusion of R M2 occlusion   -neuro checks q 1hr   -vital checsk q 1hr   -SBP <140   -CT stroke Multiphase - abrupt occlusion of RMCA, stenosis of distal R Vertebral artery, Partially imaged R suprahilar apical lung mass correlating with pt history of lung CA   -repeat imaging MRI 9/9   -tPA end time 1326, repeat imaging 18-24 hrs   -CBC,CMP, TSH, HgA1c, Lipid Panel Pending   -EKG, Echo pending   -PT/OT when appropriate     CVA (cerebral vascular accident)  See RMCA above       Cardiac/Vascular  HLD (hyperlipidemia)  Resume home meds when appropriate       Hematology  Thrombocytopenia  PLTs 37 from 31 s/p 1U PLTs   Given tPA 1326  Concern hypercoagulable state 2/2 to SCLC w/ mets   Trend CBC with diff       Oncology  Lung cancer metastatic to bone  PMHx of 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 on MRI (8/19)   CTA mutiphase - partially imaged right suprahilar/apical lung mass       Endocrine  Diabetes mellitus type II, controlled  HgA1c external 4.5   Glucose checks       Other  Tobacco abuse  PMhx of  SCLC    Consider nicotine patch while in inpatient setting           The patient is being Prophylaxed for:  Venous Thromboembolism with: Mechanical  Stress Ulcer with: PPI  Ventilator Pneumonia with: not applicable    Activity Orders          Diet Dysphagia Mechanical Soft (IDDSI Level 5) Thin: Dysphagia 2 (Mechanical Soft Ground) starting at 09/09 1948        Full Code    Elmo Tate MD  Neurocritical Care  [facility]

## 2019-09-11 NOTE — PROGRESS NOTES
[facility]  Physical Medicine & Rehab  Progress Note    Patient Name: Beatriz Valente  MRN: [id number]  Admission Date: 9/8/2019  Length of Stay: 3 days  Attending Physician: Erick Camacho MD    Subjective:     Principal Problem:Acute ischemic right MCA stroke    Hospital Course:   09/9/2019: Passed bedside swallow evaluation.  SLP recommending dental soft diet and thin liquids. SLP diagnosis of mild oral motor weakness and risk for Dysphagia .  9/10/19: 09/10/2019: Sit to stand CGA.  Ambulated 10 ft x 2 trials and 180 turn CGA. UBD and LBD SBA. Passed bedside swallow evaluation.  SLP recommending dental soft diet and thin liquids. Normal cog.     Interval History 9/11/2019:  Patient is seen for follow-up rehab evaluation and recommendations: Evaluated by therapy.    HPI, Past Medical, Family, and Social History remains the same as documented in the initial encounter.    Scheduled Medications:    atorvastatin  40 mg Oral Daily    levothyroxine  25 mcg Oral Before breakfast    pantoprazole  40 mg Oral Daily    polyethylene glycol  17 g Oral Daily       Diagnostic Results: Labs: Reviewed    PRN Medications: sodium chloride, acetaminophen, oxyCODONE, sodium chloride 0.9%, sodium chloride 0.9%    Review of Systems   Reason unable to perform ROS: somnolence.     Objective:     Vital Signs (Most Recent):  Temp: 98.4 °F (36.9 °C) (09/11/19 0705)  Pulse: 91 (09/11/19 1005)  Resp: (!) 23 (09/11/19 1005)  BP: 118/71 (09/11/19 1005)  SpO2: 97 % (09/11/19 1005)    Vital Signs (24h Range):  Temp:  [98.3 °F (36.8 °C)-98.4 °F (36.9 °C)] 98.4 °F (36.9 °C)  Pulse:  [] 91  Resp:  [10-43] 23  SpO2:  [93 %-99 %] 97 %  BP: ()/(54-92) 118/71     Physical Exam   Constitutional: She appears well-developed and well-nourished. She is sleeping. She is easily aroused. She appears ill.   On bipap    HENT:   Head: Normocephalic and atraumatic.   Eyes: Right eye exhibits no discharge. Left eye exhibits no  discharge.   Neck: Neck supple.   Cardiovascular: Normal rate and intact distal pulses.   Pulmonary/Chest: Effort normal. No respiratory distress.   Abdominal: Soft. There is no tenderness.   Musculoskeletal: She exhibits no edema or deformity.   L sided weakness    Neurological: She is easily aroused. Sensory deficit: L side.   Sleeping, easily arousable  Follows commands    Skin: Skin is warm and dry.   Psychiatric: She has a normal mood and affect. Her behavior is normal. Cognition and memory are not impaired.   Vitals reviewed.    Assessment/Plan:      * Acute ischemic right MCA stroke  -CTA revealed right M2 occlusion, now s/p thrombectomy  - Possible etiology embolic 2/2 her current risk factors as well as hypercoagulable from her cancer.  -MRI showed petechial hemorrhage    See hospital course for functional, cognitive/speech/language, and nutrition/swallow status.      Recommendations  -  Encourage mobility, OOB in chair at least 3 hours per day, and early ambulation as appropriate   -  PT/OT evaluate and treat  -  SLP speech and cognitive evaluate and treat  -  Monitor sleep disturbances and establish consistent sleep-wake cycle  -  Monitor for bowel and bladder dysfunction  -  Monitor for shoulder pain, subluxation, & spasticity  -  Monitor for and prevent skin breakdown and pressure ulcers  · Early mobility, repositioning/weight shifting every 20-30 minutes when sitting, turn patient every 2 hours, proper mattress/overlay and chair cushioning, pressure relief/heel protector boots  -  DVT prophylaxis (if appropriate)  -  Reviewed discharge options (IP rehab, SNF, HH therapy, and OP therapy)    Lung cancer metastatic to bone  -Currently on chemotherapy and radiation    Recommend outpatient PT/OT/SLP.       Mikaela Zhu NP  Department of Physical Medicine & Rehab   [facility]

## 2019-09-11 NOTE — ASSESSMENT & PLAN NOTE
53 pt was in [place] when she presented with acute LSW, L facial droop and slurred speech. Pt was given tPA 1326 and transferred to [facility] for IR and intervention.     -admitted to [facility]   - neurology following   -IR following, pt taken for IR procedure for reperfusion of R M2 occlusion   -neuro checks q 1hr   -vital checsk q 1hr   -SBP <140   -CT stroke Multiphase - abrupt occlusion of RMCA, stenosis of distal R Vertebral artery, Partially imaged R suprahilar apical lung mass correlating with pt history of lung CA   -repeat imaging MRI 9/9   -tPA end time 1326, repeat imaging 18-24 hrs   -CBC,CMP, TSH, HgA1c, Lipid Panel Pending   -EKG, Echo pending   -PT/OT when appropriate

## 2019-09-11 NOTE — ASSESSMENT & PLAN NOTE
53 y.o woman with a medical history of small cell lung carcinoma (currently receiving chemo and radiation with bony metastasis), HTN, DM2, HLD, current every day smoker and BASSAM presented to [facility] with sudden onset of facial droop and left sided plegia, NIHSS 16, VAN (+), GCS 15. Decision was made to give tPA at  and transfer to [facility] for thrombectomy. Upon arrival CTA multiphase showed right M2 occlusion. Decision was made to do thrombectomy. Will be admitted to [facility] post thrombectomy. Possible etiology embolic 2/2 her current risk factors as well as hypercoagulable from her cancer.  MRI showed petechial hemorrhage however do not see increase functional deficit.      Antithrombotics for secondary stroke prevention: Antiplatelets: None: Intracerebral Hemorrhage wait until 50K platelet to restart to ac. Will require follow up CT to follow up with bleeding.     Statins for secondary stroke prevention and hyperlipidemia, if present:   Statins: Atorvastatin- 80 mg daily    Aggressive risk factor modification: HTN, Smoking, DM, HLD, lung cancer     Rehab efforts: The patient has been evaluated by a stroke team provider and the therapy needs have been fully considered based off the presenting complaints and exam findings. The following therapy evaluations are needed: None Outpatient PT OT    Diagnostics ordered/pending: None     VTE prophylaxis: None: Reason for No Pharmacological VTE Prophylaxis: History of systemic or intracranial bleeding, Mechanical prophylaxis: Place SCDs,      BP parameters: Infarct: Post sucessful thrombectomy, SBP <140

## 2019-09-11 NOTE — ASSESSMENT & PLAN NOTE
-CTA revealed right M2 occlusion, now s/p thrombectomy  - Possible etiology embolic 2/2 her current risk factors as well as hypercoagulable from her cancer.  -MRI showed petechial hemorrhage    See hospital course for functional, cognitive/speech/language, and nutrition/swallow status.      Recommendations  -  Encourage mobility, OOB in chair at least 3 hours per day, and early ambulation as appropriate   -  PT/OT evaluate and treat  -  SLP speech and cognitive evaluate and treat  -  Monitor sleep disturbances and establish consistent sleep-wake cycle  -  Monitor for bowel and bladder dysfunction  -  Monitor for shoulder pain, subluxation, & spasticity  -  Monitor for and prevent skin breakdown and pressure ulcers  · Early mobility, repositioning/weight shifting every 20-30 minutes when sitting, turn patient every 2 hours, proper mattress/overlay and chair cushioning, pressure relief/heel protector boots  -  DVT prophylaxis (if appropriate)  -  Reviewed discharge options (IP rehab, SNF, HH therapy, and OP therapy)

## 2019-09-12 VITALS
SYSTOLIC BLOOD PRESSURE: 114 MMHG | TEMPERATURE: 98 F | HEART RATE: 79 BPM | BODY MASS INDEX: 33.48 KG/M2 | WEIGHT: 188.94 LBS | OXYGEN SATURATION: 96 % | HEIGHT: 63 IN | RESPIRATION RATE: 16 BRPM | DIASTOLIC BLOOD PRESSURE: 66 MMHG

## 2019-09-12 LAB
ALBUMIN SERPL BCP-MCNC: 3.4 G/DL (ref 3.5–5.2)
ALP SERPL-CCNC: 105 U/L (ref 55–135)
ALT SERPL W/O P-5'-P-CCNC: 12 U/L (ref 10–44)
ANION GAP SERPL CALC-SCNC: 13 MMOL/L (ref 8–16)
AST SERPL-CCNC: 26 U/L (ref 10–40)
BASOPHILS # BLD AUTO: 0.01 K/UL (ref 0–0.2)
BASOPHILS NFR BLD: 0.5 % (ref 0–1.9)
BILIRUB SERPL-MCNC: 0.4 MG/DL (ref 0.1–1)
BUN SERPL-MCNC: 10 MG/DL (ref 6–20)
CALCIUM SERPL-MCNC: 9.8 MG/DL (ref 8.7–10.5)
CHLORIDE SERPL-SCNC: 105 MMOL/L (ref 95–110)
CO2 SERPL-SCNC: 23 MMOL/L (ref 23–29)
CREAT SERPL-MCNC: 0.7 MG/DL (ref 0.5–1.4)
DIFFERENTIAL METHOD: ABNORMAL
EOSINOPHIL # BLD AUTO: 0.1 K/UL (ref 0–0.5)
EOSINOPHIL NFR BLD: 2.3 % (ref 0–8)
ERYTHROCYTE [DISTWIDTH] IN BLOOD BY AUTOMATED COUNT: 15.7 % (ref 11.5–14.5)
EST. GFR  (AFRICAN AMERICAN): >60 ML/MIN/1.73 M^2
EST. GFR  (NON AFRICAN AMERICAN): >60 ML/MIN/1.73 M^2
GLUCOSE SERPL-MCNC: 78 MG/DL (ref 70–110)
HCT VFR BLD AUTO: 29.4 % (ref 37–48.5)
HGB BLD-MCNC: 9.3 G/DL (ref 12–16)
IMM GRANULOCYTES # BLD AUTO: 0.01 K/UL (ref 0–0.04)
IMM GRANULOCYTES NFR BLD AUTO: 0.5 % (ref 0–0.5)
LYMPHOCYTES # BLD AUTO: 0.8 K/UL (ref 1–4.8)
LYMPHOCYTES NFR BLD: 36.8 % (ref 18–48)
MAGNESIUM SERPL-MCNC: 1.8 MG/DL (ref 1.6–2.6)
MCH RBC QN AUTO: 33.1 PG (ref 27–31)
MCHC RBC AUTO-ENTMCNC: 31.6 G/DL (ref 32–36)
MCV RBC AUTO: 105 FL (ref 82–98)
MONOCYTES # BLD AUTO: 0.3 K/UL (ref 0.3–1)
MONOCYTES NFR BLD: 13.6 % (ref 4–15)
NEUTROPHILS # BLD AUTO: 1 K/UL (ref 1.8–7.7)
NEUTROPHILS NFR BLD: 46.3 % (ref 38–73)
NRBC BLD-RTO: 0 /100 WBC
PHOSPHATE SERPL-MCNC: 4.4 MG/DL (ref 2.7–4.5)
PLATELET # BLD AUTO: 41 K/UL (ref 150–350)
PMV BLD AUTO: 12.4 FL (ref 9.2–12.9)
POCT GLUCOSE: 91 MG/DL (ref 70–110)
POTASSIUM SERPL-SCNC: 3.9 MMOL/L (ref 3.5–5.1)
PROT SERPL-MCNC: 7.1 G/DL (ref 6–8.4)
RBC # BLD AUTO: 2.81 M/UL (ref 4–5.4)
SODIUM SERPL-SCNC: 141 MMOL/L (ref 136–145)
WBC # BLD AUTO: 2.2 K/UL (ref 3.9–12.7)

## 2019-09-12 PROCEDURE — 80053 COMPREHEN METABOLIC PANEL: CPT

## 2019-09-12 PROCEDURE — 84100 ASSAY OF PHOSPHORUS: CPT

## 2019-09-12 PROCEDURE — 25000003 PHARM REV CODE 250: Performed by: STUDENT IN AN ORGANIZED HEALTH CARE EDUCATION/TRAINING PROGRAM

## 2019-09-12 PROCEDURE — 97535 SELF CARE MNGMENT TRAINING: CPT

## 2019-09-12 PROCEDURE — 36415 COLL VENOUS BLD VENIPUNCTURE: CPT

## 2019-09-12 PROCEDURE — 99233 SBSQ HOSP IP/OBS HIGH 50: CPT | Mod: ,,, | Performed by: PSYCHIATRY & NEUROLOGY

## 2019-09-12 PROCEDURE — 83735 ASSAY OF MAGNESIUM: CPT

## 2019-09-12 PROCEDURE — 97116 GAIT TRAINING THERAPY: CPT

## 2019-09-12 PROCEDURE — 97530 THERAPEUTIC ACTIVITIES: CPT

## 2019-09-12 PROCEDURE — 85025 COMPLETE CBC W/AUTO DIFF WBC: CPT

## 2019-09-12 PROCEDURE — 99233 PR SUBSEQUENT HOSPITAL CARE,LEVL III: ICD-10-PCS | Mod: ,,, | Performed by: PSYCHIATRY & NEUROLOGY

## 2019-09-12 RX ORDER — ATORVASTATIN CALCIUM 40 MG/1
40 TABLET, FILM COATED ORAL DAILY
Qty: 90 TABLET | Refills: 3 | Status: SHIPPED | OUTPATIENT
Start: 2019-09-13 | End: 2019-09-17

## 2019-09-12 RX ADMIN — PANTOPRAZOLE SODIUM 40 MG: 40 TABLET, DELAYED RELEASE ORAL at 09:09

## 2019-09-12 RX ADMIN — POLYETHYLENE GLYCOL 3350 17 G: 17 POWDER, FOR SOLUTION ORAL at 09:09

## 2019-09-12 RX ADMIN — LEVOTHYROXINE SODIUM 25 MCG: 25 TABLET ORAL at 05:09

## 2019-09-12 RX ADMIN — OXYCODONE HYDROCHLORIDE 5 MG: 5 TABLET ORAL at 09:09

## 2019-09-12 RX ADMIN — ATORVASTATIN CALCIUM 40 MG: 20 TABLET, FILM COATED ORAL at 09:09

## 2019-09-12 NOTE — PLAN OF CARE
Problem: Adult Inpatient Plan of Care  Goal: Plan of Care Review  Pt AAOx4.  POC and meds reviewed with patient.  Questions encouraged and answered.  Stroke education reviewed and booklet at bedside.  Patient verbalized understanding.  V/S stable throughout shift; please see flowsheet for details and full assessments.  NAEO.  Siderails up x 2, bed locked and in low position.  Call bell in reach.  02 and suction at bedside.  Will CTM.

## 2019-09-12 NOTE — NURSING
Discharge instructions and appointments given and explained the patient and her family member. The patient verbalized understanding and had no further questions. IV removed with the catheter tip intact. Patient awaiting escort.

## 2019-09-12 NOTE — PLAN OF CARE
Problem: Physical Therapy Goal  Goal: Physical Therapy Goal  Goals to be met by: 19     Patient will increase functional independence with mobility by performin. Supine to sit with Modified [place]  2. Sit to stand transfer with Supervision  3. Gait  x 200 feet with Supervision with or without using least restrictive AD.   4. Ascend/descend 3 stairs with handrail as needed with SBA   5. Lower extremity exercise program x20 reps per handout, with independence       Discharge Recommendations: OP PT    Pt safe to transfer OOB/BTB and amb with RN/PCT and : Use no AD with SBA/close sup of 1 person.    Goals remain appropriate.     Alena Muhammad, PTA.   [phone]   2019

## 2019-09-12 NOTE — PLAN OF CARE
09/12/2019      Beatriz Valente  [address]  [facility] [address]          Vascular Neurology Dept.  [facility] [address] [place] [address]  [phone] after hours  [phone] fax Diagnosis:  Acute ischemic right MCA stroke                         Debility        Please evaluate and treat for all PT and OT needs. Thank you,                    __________________________  Anu Rader NP  09/12/2019

## 2019-09-12 NOTE — PLAN OF CARE
Problem: Occupational Therapy Goal  Goal: Occupational Therapy Goal  Goals to be met by: 9/20     Patient will increase functional independence with ADLs by performing:    UE Dressing with Modified [place]. - not met  LE Dressing with Modified [place]. - not met  Grooming while standing with Modified [place]. - not met  Toileting from toilet with Modified [place] for hygiene and clothing management. - not met  Supine to sit with Modified [place]. - not met  Stand pivot transfers with Modified [place]. - not met     Outcome: Outcome(s) achieved Date Met: 09/12/19  No goals met.  Hospital discharge.

## 2019-09-12 NOTE — PT/OT/SLP PROGRESS
Occupational Therapy   Treatment & Discharge Summary    Name: Beatriz Valente  MRN: [id number]  Admitting Diagnosis:  Acute ischemic right MCA stroke       Recommendations:     Discharge Recommendations: outpatient OT  Discharge Equipment Recommendations:  none  Barriers to discharge:  None    Assessment:     Beatriz Valente is a 53 y.o. female with a medical diagnosis of Acute ischemic right MCA stroke.  She presents with good participation and motivation. Pt discharged home on this date therefore no further OT needed on acute.  Continue therapy with outpt OT.    Rehab Prognosis:  Good; patient would benefit from acute skilled OT services to address these deficits and reach maximum level of function.       Plan:     Discharge OT on acute. Continue OT with outpt.  · Plan of Care Reviewed with: patient, spouse    Subjective     Pain/Comfort:  · Pain Rating 1: 0/10  · Pain Rating Post-Intervention 1: 0/10    Objective:     Communicated with: RN prior to session.  Patient found up in chair with telemetry upon OT entry to room.  Spouse present in room.    General Precautions: Standard, aspiration, fall     Occupational Performance:     Functional Mobility/Transfers:  · Patient completed Sit <> Stand Transfer with supervision  with  no assistive device   · Functional Mobility: SBA to bathroom & back    Activities of Daily Living:  · Upper Body Dressing: supervision donning gown around back while standing (required sitting for fine motor portions due to LE pain while standing      AMPAC 6 Click ADL: 19    Treatment & Education:  Assessed sensation for temperature.  Pt found to have no sensation for temperature (hot or cold) in the left hand.  Provided education regarding techniques to maintain safety during activities involving hot/cold water & objects.  Provided education regarding safety using sharp knives during chopping/cutting food or using scissors with pt verbalizing understanding.  Provided education regarding FMC  activities that pt could perform upon discharge home as well as sensory discrimination activities to perform at home.  Pt & spouse verbalized understanding of all education provided.    Patient left up in chair with all lines intact, call button in reach, RN notified, spouse present and white board updated.Education:      GOALS:   Multidisciplinary Problems     Occupational Therapy Goals     Not on file          Multidisciplinary Problems (Resolved)        Problem: Occupational Therapy Goal    Goal Priority Disciplines Outcome Interventions   Occupational Therapy Goal   (Resolved)     OT, PT/OT Outcome(s) achieved    Description:  Goals to be met by: 9/20     Patient will increase functional independence with ADLs by performing:    UE Dressing with Modified [place]. - not met  LE Dressing with Modified [place]. - not met  Grooming while standing with Modified [place]. - not met  Toileting from toilet with Modified [place] for hygiene and clothing management. - not met  Supine to sit with Modified [place]. - not met  Stand pivot transfers with Modified [place]. - not met                       Time Tracking:     OT Date of Treatment: 09/12/19  OT Start Time: 1039  OT Stop Time: 1108  OT Total Time (min): 29 min    Billable Minutes:Self Care/Home Management 14  Therapeutic Activity 15    SARKIS Taylor  9/12/2019

## 2019-09-12 NOTE — PLAN OF CARE
09/12/19 1622   Final Note   Assessment Type Final Discharge Note   Anticipated Discharge Disposition Home   What phone number can be called within the next 1-3 days to see how you are doing after discharge? [phone]   Hospital Follow Up  Appt(s) scheduled? Yes   Discharge plans and expectations educations in teach back method with documentation complete? Yes   Right Care Referral Info   Post Acute Recommendation No Care

## 2019-09-12 NOTE — PLAN OF CARE
Problem: Fall Injury Risk  Goal: Absence of Fall and Fall-Related Injury  Outcome: Ongoing (interventions implemented as appropriate)  Fall prevention education provided to the patient. The patient verbalized understanding and had no further questions.    Comments: Fall prevention education provided to the patient. The patient verbalized understanding and had no further questions.

## 2019-09-12 NOTE — PLAN OF CARE
09/12/19 1256   Post-Acute Status   Post-Acute Authorization Other   Other Status No Post-Acute Service Needs       No Needs noted.  SW in contact with CM and Medical staff. Will continue to follow and offer support as needed.     Mingo Reyes LMSW  [facility]   Ext. [phone]

## 2019-09-12 NOTE — PLAN OF CARE
Pt will be going to outpatient therapy.     09/12/19 1321   Final Note   Assessment Type Final Discharge Note   Anticipated Discharge Disposition Home   Right Care Referral Info   Post Acute Recommendation No Care

## 2019-09-12 NOTE — PT/OT/SLP PROGRESS
Physical Therapy Treatment    Patient Name:  Beatriz Valente   MRN:  [id number]  Admitting Diagnosis: Acute ischemic right MCA stroke  Recent Surgery: * No surgery found *      Recommendations:     Discharge Recommendations:  outpatient PT   Discharge Equipment Recommendations: none   Barriers to discharge: {ACUTE PT BARRIERS TO DISCHARGE OHS:24057}***    Plan:     During this hospitalization, patient to be seen 3 x/week to address the above listed problems via gait training, therapeutic activities, therapeutic exercises, neuromuscular re-education  · Plan of Care Expires:  10/09/19   Plan of Care Reviewed with: patient, spouse    This Plan of care has been discussed with the patient who was involved in its development and understands and is in agreement with the identified goals and treatment plan    Subjective     Communicated with RN (***) prior to session.     Patient comments: ***  Pain/Comfort:  · Pain Rating 1: 0/10  · Pain Rating Post-Intervention 1: 0/10    Objective:     Patient found with: SCD, telemetry    Patient found *** upon PT entry to room, agreeable to treatment.  *** present in the room.    General Precautions: Standard, aspiration, fall   Orthopedic Precautions:N/A   Braces: N/A       BED MOBILITY (vc's for hand placement sequencing of task):        Rolling to the R:  ***.       Rolling to the L:  ***.        Sup > sit at the EOB:  ***A for ***.       Sit > sup:  ***A for ***.       ***                SITTING AT THE EDGE OF THE BED (*** min)***   Assistance Level Required: ***A for *** with *** UE support   Postural deviations noted: ***   Encouraged: ***        TRANSFERS  (vc's for hand placement, sequencing of task and safety)   Patient completed Sit <> Stand Transfer from *** with ***A*** for *** with {IP PT OT AD TRANSFER OHS:68587} x*** trial(s)   Patient completed Stand <> Sit Transfer to *** with ***A for *** with {IP PT OT AD TRANSFER OHS:30380}    Patient completed Bed <> *** Transfer  using {PT BED CHAIR TECHNIQUE:03804} technique *** with ***A with {IP PT OT AD TRANSFER OHS:73176}   Patient completed Chair <> *** Transfer using {PT BED CHAIR TECHNIQUE:65633} technique *** with ***A with {IP PT OT AD TRANSFER             OHS:71053}    GAIT: ***   Patient ambulated: ***ft   Patient required: ***A for ***   Patient used:  {AD DEVICE:89122}   Gait Pattern observed: {PT GAIT PATTERN:27384}   Gait Deviation(s): {Gait Deviation(s):47276}    Comments: ***      THERAPEUTIC ACTIVITIES/EXERCISES  ***    EDUCATION  ***    Whiteboard updated with correct mobility information. RN/PCT notified.  Pt safe to transfer OOB/BTB and amb with RN/PCT and : Use no AD with SBA/close sup of 1 person.    Patient left {IP OT PATIENT LEFT POSITION OHS:702169306}, with  {Patient left with:72043}    AM-PAC 6 CLICK MOBILITY  Turning over in bed (including adjusting bedclothes, sheets and blankets)?: 4  Sitting down on and standing up from a chair with arms (e.g., wheelchair, bedside commode, etc.): 3  Moving from lying on back to sitting on the side of the bed?: 4  Moving to and from a bed to a chair (including a wheelchair)?: 3  Need to walk in hospital room?: 3  Climbing 3-5 steps with a railing?: 3  Basic Mobility Total Score: 20     Assessment:     Beatriz Valente is a 53 y.o. female admitted with a medical diagnosis of Acute ischemic right MCA stroke.  She presents with the following impairments/functional limitations:  weakness, impaired endurance, gait instability, impaired balance, decreased lower extremity function. ***    Rehab Prognosis:  ***; patient would benefit from acute skilled PT services to address these deficits and reach maximum level of function.      GOALS:   Multidisciplinary Problems     Physical Therapy Goals        Problem: Physical Therapy Goal    Goal Priority Disciplines Outcome Goal Variances Interventions   Physical Therapy Goal     PT, PT/OT Ongoing (interventions implemented as  appropriate)     Description:  Goals to be met by: 19     Patient will increase functional independence with mobility by performin. Supine to sit with Modified [place]  2. Sit to stand transfer with Supervision  3. Gait  x 200 feet with Supervision with or without using least restrictive AD.   4. Ascend/descend 3 stairs with handrail as needed with SBA   5. Lower extremity exercise program x20 reps per handout, with independence                      Time Tracking:     PT Received On: 19  PT Start Time: 938     PT Stop Time: 1016  PT Total Time (min): 38 min     Billable Minutes: Gait Training 15 and Therapeutic Activity 23    Treatment Type: Treatment  PT/PTA: PTA     PTA Visit Number: 1       Alena Muhammad PTA.  Pager [phone]    2019    .

## 2019-09-13 ENCOUNTER — TELEPHONE (OUTPATIENT)
Dept: NEUROLOGY | Facility: HOSPITAL | Age: 54
End: 2019-09-13

## 2019-09-13 NOTE — SUBJECTIVE & OBJECTIVE
Neurologic Chief Complaint: Right extremity weakness and right facial droop     Subjective:     Interval History: Patient is seen for follow-up neurological assessment and treatment recommendations: JONON. Plts 41; discussed with Heme/Onc on call and no need for further platelet transfusions. Pt is stable for discharge home with outpatient therapy. ASA to be started on an outpatient basis pending improvement of pt's thrombocytopenia.     HPI, Past Medical, Family, and Social History remains the same as documented in the initial encounter.     Review of Systems   Constitutional: Negative for fatigue and fever.   Eyes: Negative for discharge and visual disturbance.   Neurological: Positive for facial asymmetry, speech difficulty, weakness and numbness.   Psychiatric/Behavioral: Negative for agitation, confusion and decreased concentration.     Scheduled Meds:    Continuous Infusions:  PRN Meds:    Objective:     Vital Signs (Most Recent):  Temp: 98.4 °F (36.9 °C) (09/12/19 1211)  Pulse: 79 (09/12/19 1211)  Resp: 16 (09/12/19 0757)  BP: 114/66 (09/12/19 1211)  SpO2: 96 % (09/12/19 1221)  BP Location: Left arm    Vital Signs Range (Last 24H):  Temp:  [97 °F (36.1 °C)-98.4 °F (36.9 °C)]   Pulse:  [77-87]   Resp:  [12-18]   BP: (103-126)/(60-69)   SpO2:  [94 %-96 %]   BP Location: Left arm    Physical Exam   Constitutional: She is oriented to person, place, and time. She appears well-developed. No distress.   HENT:   Head: Normocephalic and atraumatic.   Eyes: Conjunctivae and EOM are normal.   Cardiovascular: Normal rate.   Pulmonary/Chest: Effort normal. No respiratory distress.   Musculoskeletal: She exhibits no edema, tenderness or deformity.   Neurological: She is alert and oriented to person, place, and time. A sensory deficit is present. She exhibits normal muscle tone. Coordination normal.   Skin: Skin is warm and dry. She is not diaphoretic.   Psychiatric: She has a normal mood and affect. Her behavior is normal.        Neurological Exam:   LOC: alert  Attention Span: Good   Language: No aphasia  Articulation: Dysarthria  Orientation: Person, Place, Time   Visual Fields: Full  EOM (CN III, IV, VI): Full/intact  Facial Movement (CN VII): Lower facial weakness on the Left  Motor: Arm left  Paresis: 3/5  Leg left  Paresis: 4/5  Arm right  Normal 5/5  Leg right Normal 5/5  Cebellar: No evidence of appendicular or axial ataxia  Sensation: Enmanuel-hypoesthesia left  Tone: Normal tone throughout    Laboratory:  BMP:   Recent Labs   Lab 09/12/19  0435      K 3.9      CO2 23   BUN 10   CREATININE 0.7   CALCIUM 9.8     CBC:   Recent Labs   Lab 09/12/19  0643   WBC 2.20*   RBC 2.81*   HGB 9.3*   HCT 29.4*   PLT 41*   *   MCH 33.1*   MCHC 31.6*     Lipid Panel:   Recent Labs   Lab 09/08/19  1728   CHOL 181   LDLCALC 107.6   HDL 37*   TRIG 182*     Coagulation:   Recent Labs   Lab 09/08/19  1313   INR 0.9     Hgb A1C:   Recent Labs   Lab 09/08/19  1728   HGBA1C 4.5       Diagnostic Results     Brain Imaging     CT Head 9/10/19:  Evolving right MCA distribution infarct as above.  Small volume of subarachnoid hemorrhage, centered in the right sylvian fissure.  No new hemorrhage or infarction elsewhere.    MRI Brain WO Contrast 9/9/19  Mild moderate region of right MCA distribution acute/recent infarction with petechial susceptibility in the parietal lobe components concerning for petechial hemorrhagic conversion.  There is localized mass effect without midline shift or hydrocephalus.  Scattered patchy and confluent foci of T2 FLAIR signal hyperintensity elsewhere supratentorial white matter with ill-defined component in the maura while nonspecific concerning for mild moderate underlying chronic ischemic change.    CT Head 9/8/19  No CT evidence of acute intracranial abnormality.  All CT scans at this facility use dose modulation, iterative reconstruction and/or weight based dosing when appropriate to reduce radiation dose to  as low as reasonably achievable.      Vessel Imaging     Cerebral Angiogram 9/8/19  1. Cerebral angiogram demonstrates occlusion of the M2 segment of the right middle cerebral artery..  2. Aspiration thrombectomy was performed (ADAPT) Technique with restoration TICI 3 Flow in the right middle cerebral artery territory.    CTA Multiphase 9/8/19  Abrupt occlusion of the lateral M2 segment of the right MCA.  No significant parenchymal hypoattenuation, edema/ mass effect, or hemorrhage.  Stenosis of the distal right vertebral artery.  Partially imaged right suprahilar/ apical lung mass, correlating with patient's history of lung cancer.  Indeterminate 2.1 cm left parotid mass, possible pleomorphic adenoma.      Cardiac Imaging     TTE 9/9/19  · Concentric left ventricular remodeling.  · Normal left ventricular systolic function. The estimated ejection fraction is 65%  · Mild right ventricular enlargement.  · Normal right ventricular systolic function.  · Normal LV diastolic function.  · Normal central venous pressure (3 mm Hg).  · No wall motion abnormalities.  · No evidence of intracardiac shunting.

## 2019-09-13 NOTE — ASSESSMENT & PLAN NOTE
Upon review of brain imaging, moderate area of cytotoxic cerebral edema identified in the territory of the Right middle cerebral artery. There is mild localized associated mass effect.   The patients clinical exam remained without any worsening of symptoms which may indicate expansion of the stroke or the area of edema resulting in such clinical change.   Pattern is suggestive of an embolic etiology 2/2 hypercoagulable state due to active malignancy.

## 2019-09-13 NOTE — ASSESSMENT & PLAN NOTE
Stroke risk factor  Currently receiving chemotherapy and radiation under the care of Candi Chan MD at [facility].  Outpatient follow-up as previously scheduled.

## 2019-09-13 NOTE — ASSESSMENT & PLAN NOTE
Stroke risk factor  Currently receiving chemotherapy and radiation under the care of Candi Chan MD at [facility].  Outpatient follow-up as previously scheduled.

## 2019-09-13 NOTE — ASSESSMENT & PLAN NOTE
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 She received tPA at  and was transferred to [facility]. Upon arrival, CTA Multiphase showed R M2 occlusion. Pt taken for IR thrombectomy which was successful with subsequent TICI 3 flow. Patient was with remarkable neurologic improvement post-procedurally.    MRI Brain demonstrated acute infarct in the R MCA territory. Suspected etiology likely embolic 2/2 hypercoagulable state from pt's known cancer. CT later demonstrated mild petechial hemorrhagic conversion, though it was not of clinical significance.    Will plan to initiate ASA on an outpatient basis as able based on pt's plt count (see below.) Pt now stable for discharge home with outpatient therapy.       Antithrombotics for secondary stroke prevention: Antiplatelets: Aspirin: 81 mg daily eventually, once Plt count maintains >50,000  Statins for secondary stroke prevention and hyperlipidemia, if present:   Statins: Atorvastatin- 40 mg daily  Aggressive risk factor modification: HTN, Smoking, DM, HLD, Diet, Exercise, lung cancer  Rehab efforts: The patient has been evaluated by a stroke team provider and the therapy needs have been fully considered based off the presenting complaints and exam findings. The following therapy evaluations are needed: PT evaluate and treat, OT evaluate and treat - Dispo outpatient therapy  Diagnostics ordered/pending: None   VTE prophylaxis: None: Reason for No Pharmacological VTE Prophylaxis: History of systemic or intracranial bleeding, Mechanical prophylaxis: Place SCDs,    BP parameters: Infarct: Post sucessful thrombectomy, SBP <140

## 2019-09-13 NOTE — ASSESSMENT & PLAN NOTE
Upon review of brain imaging, moderate area of cytotoxic cerebral edema identified in the territory of the Right middle cerebral artery. There is mild localized associated mass effect.   We will continue to monitor the patients clinical exam for any worsening of symptoms which may indicate expansion of the stroke or the area of edema resulting in such clinical change.   Pattern is suggestive of an embolic etiology 2/2 hypercoagulable state due to active malignancy.

## 2019-09-13 NOTE — ASSESSMENT & PLAN NOTE
Pt's plt count ranging 31,000-41,000 this admission. Per Heme/Onc, likely 2/2 to pt's recent chemo treatments.  Pt was given 1U plts following thrombectomy due to concurrent receipt of thrombolytics.  Discussed with Heme/Onc on-call -- No need for further plt transfusions at this time, pt out of window for previous concern of bleed risk; pt's levels should now normalize naturally over time.    We are deferring initiating ASA therapy for secondary stroke prevention at this time in the setting of pt's thrombocytopenia. Would recommend for pt's Plt count to be persistently > 50,000 prior to starting.  Will reach out to pt's Oncologist, Dr. Candi Chan vs PCP about following pt's plt counts for surveillence and considering initiation of ASA as able. Appreciate assistance.

## 2019-09-13 NOTE — ASSESSMENT & PLAN NOTE
Pt's plt count ranging 31,000-41,000 this admission. Per Heme/Onc, likely 2/2 to pt's recent chemo treatments.  Pt was given 1U plts following thrombectomy due to concurrent receipt of thrombolytics.  Discussed with Heme/Onc on-call -- No need for further plt transfusions at this time, out of window for previous concern of bleed risk; pt's levels should now normalize naturally over time.    We are deferring initiating ASA therapy for secondary stroke prevention in the setting of pt's thrombocytopenia. Would recommend for pt's Plt count to be persistently > 50,000 prior to starting.  Will reach out to pt's Oncologist, Dr. Candi Chan, about following pt's plt counts for surveillence and considering initiation of ASA as able. Appreciate assistance.

## 2019-09-13 NOTE — ASSESSMENT & PLAN NOTE
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 She received tPA at  and was transferred to [facility]. Upon arrival, CTA Multiphase showed R M2 occlusion. Pt taken for IR thrombectomy which was successful with subsequent TICI 3 flow. Patient was with remarkable neurologic improvement post-procedurally.    MRI Brain demonstrated acute infarct in the R MCA territory. Suspected etiology likely embolic 2/2 hypercoagulable state from pt's known cancer. CT Head later demonstrated very mild petechial hemorrhagic conversion, though this was not clinically significant. Will plan to initiate ASA on an outpatient basis as able based on pt's plt count (see below.)       Antithrombotics for secondary stroke prevention: Antiplatelets: Aspirin: 81 mg daily eventually, once Plt count maintains >50,000  Statins for secondary stroke prevention and hyperlipidemia, if present:   Statins: Atorvastatin- 40 mg daily  Aggressive risk factor modification: HTN, Smoking, DM, HLD, Diet, Exercise, lung cancer  Rehab efforts: Outpatient therapy  BP parameters: Infarct: Post sucessful thrombectomy, SBP <140

## 2019-09-13 NOTE — ASSESSMENT & PLAN NOTE
Current every day smoker.   Stroke risk factor   pt on importance of cessation for secondary stroke prevention also in the setting of active lung CA.  Consider Nicotine patch vs Ambulatory referral to smoking cessation per pt's PCP or Oncologist.

## 2019-09-13 NOTE — DISCHARGE SUMMARY
[facility]  Vascular Neurology  Comprehensive Stroke Center  Discharge Summary     Summary:     Admit Date: 9/8/2019  3:01 PM    Discharge Date and Time: 9/12/2019  2:09 PM    Attending Physician: Pedro Luis Wang MD    Discharge Provider: Sharon Hirsch PA-C    History of Present Illness: 54 y/o woman with a medical history of small cell lung carcinoma (with possible metastasis to the brain receiving chemo and radiation), GERD, HLD and DM was brought into the ER at [facility] for sudden onset of left sided facial droop with left sided hemiplegia when she was visiting her daughter who was in labor. Last known normal is 12:30 PM today.  Patient denied any chest pain, SOB, neck pain or headache.    During telestroke conference NIHSS was 15, VAN +, and patient was within the tPA window. After extensive conversation with the family regarding increased ICH risk with cancer and potential metastasis patient and family was transferred here for potential IR intervention. Upon arrival stroke NIHSS still 16. CTA multiphase done showed right M2 occlusion. Decision was made to do thrombectomy.       Hospital Course (synopsis of major diagnoses, care, treatment, and services provided during the course of the hospital stay): Ms. Beatriz Valente was admitted to Neuro ICU for higher level of care s/p tPA and IR thrombectomy. Stroke etiology suspected to be embolism 2/2 hypercoagulable state in the setting of known malignancy at this time. Unable to initiate an antithrombotic regimen at this time due to pt with thrombocytopenia 2/2 chemo. Please see below regarding plans to initiate on an outpatient basis.    Patient discharged with recommendations for outpatient therapy. Family at bedside amenable to plan.     Patient with improvement in stroke symptoms since admission. Inpatient acute stroke work up completed and patient stable for discharge. Please see all appropriate medication changes, imaging results,  and necessary follow-up below.     Stroke Etiology: Undetermined Cause. Cryptogenic Embolism / ESUS (Embolic Stroke of Undetermined Source)    STROKE DOCUMENTATION   Acute Stroke Times   Last Known Normal Date: 09/08/19  Last Known Normal Time: 1230  Symptom Onset Date: 09/08/19  Symptom Onset Time: 1230  Stroke Team Called Date: 09/08/19  Stroke Team Called Time: 1500  Stroke Team Arrival Date: 09/08/19  Stroke Team Arrival Time: 1500  CT Interpretation Time: 1217  Decision to Treat Time for Alteplase: 1326     NIH Scale:  1a. Level of Consciousness: 0-->Alert, keenly responsive  1b. LOC Questions: 0-->Answers both questions correctly  1c. LOC Commands: 0-->Performs both tasks correctly  2. Best Gaze: 0-->Normal  3. Visual: 0-->No visual loss  4. Facial Palsy: 1-->Minor paralysis (flattened nasolabial fold, asymmetry on smiling)  5a. Motor Arm, Left: 1-->Drift, limb holds 90 (or 45) degrees, but drifts down before full 10 seconds, does not hit bed or other support  5b. Motor Arm, Right: 0-->No drift, limb holds 90 (or 45) degrees for full 10 secs  6a. Motor Leg, Left: 1-->Drift, leg falls by the end of the 5-sec period but does not hit bed  6b. Motor Leg, Right: 0-->No drift, leg holds 30 degree position for full 5 secs  7. Limb Ataxia: 0-->Absent  8. Sensory: 1-->Mild-to-moderate sensory loss, patient feels pinprick is less sharp or is dull on the affected side, or there is a loss of superficial pain with pinprick, but patient is aware of being touched  9. Best Language: 0-->No aphasia, normal  10. Dysarthria: 1-->Mild-to-moderate dysarthria, patient slurs at least some words and, at worst, can be understood with some difficulty  11. Extinction and Inattention (formerly Neglect): 0-->No abnormality  Total (NIH Stroke Scale): 5        Modified [place] Score: 2  Dolphin Coma Scale:    ABCD2 Score:    [id number] Score:   HAS -BLED Score:   ICH Score:   Hunt & Dewey Classification:       Assessment/Plan:      Diagnostic Results:    Brain Imaging      CT Head 9/10/19:  Evolving right MCA distribution infarct as above.  Small volume of subarachnoid hemorrhage, centered in the right sylvian fissure.  No new hemorrhage or infarction elsewhere.     MRI Brain WO Contrast 9/9/19  Mild moderate region of right MCA distribution acute/recent infarction with petechial susceptibility in the parietal lobe components concerning for petechial hemorrhagic conversion.  There is localized mass effect without midline shift or hydrocephalus.  Scattered patchy and confluent foci of T2 FLAIR signal hyperintensity elsewhere supratentorial white matter with ill-defined component in the maura while nonspecific concerning for mild moderate underlying chronic ischemic change.     CT Head 9/8/19  No CT evidence of acute intracranial abnormality.  All CT scans at this facility use dose modulation, iterative reconstruction and/or weight based dosing when appropriate to reduce radiation dose to as low as reasonably achievable.        Vessel Imaging      Cerebral Angiogram 9/8/19  1. Cerebral angiogram demonstrates occlusion of the M2 segment of the right middle cerebral artery..  2. Aspiration thrombectomy was performed (ADAPT) Technique with restoration TICI 3 Flow in the right middle cerebral artery territory.     CTA Multiphase 9/8/19  Abrupt occlusion of the lateral M2 segment of the right MCA.  No significant parenchymal hypoattenuation, edema/ mass effect, or hemorrhage.  Stenosis of the distal right vertebral artery.  Partially imaged right suprahilar/ apical lung mass, correlating with patient's history of lung cancer.  Indeterminate 2.1 cm left parotid mass, possible pleomorphic adenoma.        Cardiac Imaging      TTE 9/9/19  · Concentric left ventricular remodeling.  · Normal left ventricular systolic function. The estimated ejection fraction is 65%  · Mild right ventricular enlargement.  · Normal right ventricular systolic  function.  · Normal LV diastolic function.  · Normal central venous pressure (3 mm Hg).  · No wall motion abnormalities.  · No evidence of intracardiac shunting.      Interventions: IV t-PA, Thrombectomy    Complications: None    Disposition: Home or Self Care    Final Active Diagnoses:    Diagnosis Date Noted POA    PRINCIPAL PROBLEM:  Embolic stroke involving right middle cerebral artery [I63.411] 09/09/2019 Yes    Acute ischemic right MCA stroke [I63.511] 09/08/2019 Yes    Thrombocytopenia [D69.6] 09/08/2019 Yes    Lung cancer metastatic to bone [C34.90, C79.51] 11/14/2018 Yes    Cytotoxic cerebral edema [G93.6] 09/09/2019 Yes    HLD (hyperlipidemia) [E78.5] 09/08/2019 Yes    Diabetes mellitus type II, controlled [E11.9] 09/08/2019 Yes    Tobacco abuse [Z72.0] 11/14/2018 Yes    Small cell lung cancer in adult [C34.90] 09/09/2019 Yes    Brain compression [G93.5] 09/09/2019 Yes    CPAP (continuous positive airway pressure) dependence [Z99.89] 09/09/2019 Not Applicable    CVA (cerebral vascular accident) [I63.9] 09/08/2019 Yes      Problems Resolved During this Admission:     * Embolic stroke involving right middle cerebral artery  See Acute ischemic stroke section    Acute ischemic right MCA stroke  52 yo woman with PMHx small cell lung carcinoma with bony mets (currently receiving chemo and radiation), HTN, DM2, HLD, tobacco abuse, and BASSAM presented to [facility] with sudden onset of L facial droop and L hemiplegia. She received tPA at  and was transferred to [facility]. Upon arrival, CTA Multiphase showed R M2 occlusion. Pt taken for IR thrombectomy which was successful with subsequent TICI 3 flow. Patient was with remarkable neurologic improvement post-procedurally.    MRI Brain demonstrated acute infarct in the R MCA territory. Suspected etiology likely embolic 2/2 hypercoagulable state from pt's known cancer. CT Head later demonstrated very mild petechial hemorrhagic conversion, though this was not  clinically significant. Will plan to initiate ASA on an outpatient basis as able based on pt's plt count (see below.)       Antithrombotics for secondary stroke prevention: Antiplatelets: Aspirin: 81 mg daily eventually, once Plt count maintains >50,000  Statins for secondary stroke prevention and hyperlipidemia, if present:   Statins: Atorvastatin- 40 mg daily  Aggressive risk factor modification: HTN, Smoking, DM, HLD, Diet, Exercise, lung cancer  Rehab efforts: Outpatient therapy  BP parameters: Infarct: Post sucessful thrombectomy, SBP <140    Thrombocytopenia  Pt's plt count ranging 31,000-41,000 this admission. Per Heme/Onc, likely 2/2 to pt's recent chemo treatments.  Pt was given 1U plts following thrombectomy due to concurrent receipt of thrombolytics.  Discussed with Heme/Onc on-call -- No need for further plt transfusions at this time, pt out of window for previous concern of bleed risk; pt's levels should now normalize naturally over time.    We are deferring initiating ASA therapy for secondary stroke prevention at this time in the setting of pt's thrombocytopenia. Would recommend for pt's Plt count to be persistently > 50,000 prior to starting.  Will reach out to pt's Oncologist, Dr. Candi Chan vs PCP about following pt's plt counts for surveillence and considering initiation of ASA as able. Appreciate assistance.    Lung cancer metastatic to bone  Stroke risk factor  Currently receiving chemotherapy and radiation under the care of Candi Chan MD at [facility].  Outpatient follow-up as previously scheduled.    Cytotoxic cerebral edema  Upon review of brain imaging, moderate area of cytotoxic cerebral edema identified in the territory of the Right middle cerebral artery. There is mild localized associated mass effect.   The patients clinical exam remained without any worsening of symptoms which may indicate expansion of the stroke or the area of edema resulting in such clinical  change.   Pattern is suggestive of an embolic etiology 2/2 hypercoagulable state due to active malignancy.    Diabetes mellitus type II, controlled  Stroke risk factor  A1c 4.5%  Recommend tight glucose control  SSI while inpatient  PCP follow-up    HLD (hyperlipidemia)  Stroke risk factor     Atorvastatin 40mg Daily    Tobacco abuse  Current every day smoker.   Stroke risk factor   pt on importance of cessation for secondary stroke prevention also in the setting of active lung CA.  Consider Nicotine patch vs Ambulatory referral to smoking cessation per pt's PCP or Oncologist.        Recommendations:     Post-discharge complication risks: Falls    Stroke Education given to: patient and family    Follow-up in Stroke Clinic in 6-8 weeks.     Discharge Plan:  Antithrombotics: Aspirin 81mg eventually, once Plt count maintains >50,000  Statin: Atorvastatin 40mg  Smoking Cessation  Aggresive risk factor modification:  Hypertension  Smoking  Diabetes  High Cholesterol  Diet  Exercise  Lung CA    Follow Up:  Follow-up Information     Roman Gonzalez MD On 9/19/2019.    Specialty:  Family Medicine  Why:  8 am Hospital Folllow up  Contact information:  [address]  [address]  [facility] [address]  [phone]             [facility] VASCULAR NEUROLOGY In 4 weeks.    Specialty:  Vascular Neurology  Why:  Someone from our office will call you to set up hospital follow-up in 4-6 weeks. If nobody calls within 1 week, call number above to schedule an appt.  Contact information:  [address]  [facility] [id number] [phone]           Candi Chan MD In 2 weeks.    Specialty:  Internal Medicine  Why:  Follow-up with your Oncologist as previously established.  Contact information:  [address]  [address]  Hematology Oncology  [facility] [id number] [phone]                   Patient Instructions:   No discharge procedures on file.    Medications:  Reconciled Home Medications:       Medication List      START taking these medications    atorvastatin 40 MG tablet  Commonly known as:  LIPITOR  Take 1 tablet (40 mg total) by mouth once daily.        CONTINUE taking these medications    ALPRAZolam 0.5 MG tablet  Commonly known as:  XANAX  Take 0.5 mg by mouth 3 (three) times daily as needed for Anxiety.     docusate sodium 100 MG capsule  Commonly known as:  COLACE  Take 100 mg by mouth 2 (two) times daily.     fentaNYL 37.5 mcg/hour Pt72  Place 37.5 mcg/hr onto the skin every 72 hours.     FISH OIL 1,000 mg (120 mg-180 mg) Cap  Generic drug:  omega 3-dha-epa-fish oil  Take 1 capsule by mouth once daily.     flaxseed oil 1,000 mg Cap  Take 1 capsule by mouth once daily.     fluticasone propionate 50 mcg/actuation nasal spray  Commonly known as:  FLONASE  1 spray by Each Nare route daily as needed for Rhinitis.     levothyroxine 25 MCG tablet  Commonly known as:  SYNTHROID  Take 25 mcg by mouth once daily.     milk thistle 175 mg tablet  Take 175 mg by mouth once daily.     multivitamin capsule  Take 1 capsule by mouth once daily.     multivitamin tablet  Commonly known as:  THERAGRAN  Take 1 tablet by mouth once daily.     oxyCODONE-acetaminophen  mg per tablet  Commonly known as:  PERCOCET  Take 1 tablet by mouth 2 (two) times daily.     pantoprazole 40 MG tablet  Commonly known as:  PROTONIX  Take 40 mg by mouth once daily.     polyethylene glycol 17 gram Pwpk  Commonly known as:  GLYCOLAX  Take by mouth.     PROAIR HFA 90 mcg/actuation inhaler  Generic drug:  albuterol  Inhale 2 puffs into the lungs every 6 (six) hours as needed for Wheezing. Rescue            Sharon Hirsch PA-C  [facility] Stroke Center  Department of Vascular Neurology   [facility]

## 2019-09-13 NOTE — PROGRESS NOTES
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 She received tPA at  and was transferred to [facility] [place]. Upon arrival, CTA Multiphase showed R M2 occlusion. Pt taken for IR thrombectomy which was successful with subsequent TICI 3 flow. Patient was with remarkable neurologic improvement post-procedurally.    MRI Brain demonstrated acute infarct in the R MCA territory. Suspected etiology likely embolic 2/2 hypercoagulable state from pt's known cancer. CT later demonstrated mild petechial hemorrhagic conversion, though it was not of clinical significance.    Will plan to initiate ASA on an outpatient basis as able based on pt's plt count (see below.) Pt now stable for discharge home with outpatient therapy.       Antithrombotics for secondary stroke prevention: Antiplatelets: Aspirin: 81 mg daily eventually, once Plt count maintains >50,000  Statins for secondary stroke prevention and hyperlipidemia, if present:   Statins: Atorvastatin- 40 mg daily  Aggressive risk factor modification: HTN, Smoking, DM, HLD, Diet, Exercise, lung cancer  Rehab efforts: The patient has been evaluated by a stroke team provider and the therapy needs have been fully considered based off the presenting complaints and exam findings. The following therapy evaluations are needed: PT evaluate and treat, OT evaluate and treat - Dispo outpatient therapy  Diagnostics ordered/pending: None   VTE prophylaxis: None: Reason for No Pharmacological VTE Prophylaxis: History of systemic or intracranial bleeding, Mechanical prophylaxis: Place SCDs,     BP parameters: Infarct: Post sucessful thrombectomy, SBP <140    Thrombocytopenia  Pt's plt count ranging 31,000-41,000 this admission. Per Heme/Onc, likely 2/2 to pt's recent chemo treatments.  Pt was given 1U plts following thrombectomy due to concurrent receipt of thrombolytics.  Discussed with Heme/Onc on-call -- No need for further plt transfusions at this time, out of window for previous concern of bleed risk; pt's levels should now normalize naturally over time.    We are deferring initiating ASA therapy for secondary stroke prevention in the setting of pt's thrombocytopenia. Would recommend for pt's Plt count to be persistently > 50,000 prior to starting.  Will reach out to pt's Oncologist, Dr. Candi Chan, about following pt's plt counts for surveillence and considering initiation of ASA as able. Appreciate assistance.    Lung cancer metastatic to bone  Stroke risk factor  Currently receiving chemotherapy and radiation under the care of Candi Chan MD at [facility].  Outpatient follow-up as previously scheduled.    Cytotoxic cerebral edema  Upon review of brain imaging, moderate area of cytotoxic cerebral edema identified in the territory of the Right middle cerebral artery. There is mild localized associated mass effect.   We will continue to monitor the patients clinical exam for any worsening of symptoms which may indicate expansion of the stroke or the area of edema resulting in such clinical change.   Pattern is suggestive of an embolic etiology 2/2 hypercoagulable state due to active malignancy.    Diabetes mellitus type II, controlled  Stroke risk factor  A1c 4.5%  Recommend tight glucose control  SSI while inpatient  PCP follow-up    HLD (hyperlipidemia)  Stroke risk factor     Atorvastatin 40mg Daily    Tobacco abuse  Current every day smoker.   Stroke risk factor   pt on importance of cessation for secondary stroke prevention also in the setting of active lung  CA.  Consider Nicotine patch vs Ambulatory referral to smoking cessation per pt's PCP or Oncologist.         9/9/19: MRI showed concerns for petechial hemorrhagic conversion.  9/12: Plts 41; discussed with Heme/Onc on call and no need for further platelet transfusions. Pt is stable for discharge home with outpatient therapy. ASA to be started on an outpatient basis pending improvement of pt's thrombocytopenia.     STROKE DOCUMENTATION   Acute Stroke Times   Last Known Normal Date: 09/08/19  Last Known Normal Time: 1230  Symptom Onset Date: 09/08/19  Symptom Onset Time: 1230  Stroke Team Called Date: 09/08/19  Stroke Team Called Time: 1500  Stroke Team Arrival Date: 09/08/19  Stroke Team Arrival Time: 1500  CT Interpretation Time: 1217  Decision to Treat Time for Alteplase: 1326    NIH Scale:  1a. Level of Consciousness: 0-->Alert, keenly responsive  1b. LOC Questions: 0-->Answers both questions correctly  1c. LOC Commands: 0-->Performs both tasks correctly  2. Best Gaze: 0-->Normal  3. Visual: 0-->No visual loss  4. Facial Palsy: 1-->Minor paralysis (flattened nasolabial fold, asymmetry on smiling)  5a. Motor Arm, Left: 1-->Drift, limb holds 90 (or 45) degrees, but drifts down before full 10 seconds, does not hit bed or other support  5b. Motor Arm, Right: 0-->No drift, limb holds 90 (or 45) degrees for full 10 secs  6a. Motor Leg, Left: 1-->Drift, leg falls by the end of the 5-sec period but does not hit bed  6b. Motor Leg, Right: 0-->No drift, leg holds 30 degree position for full 5 secs  7. Limb Ataxia: 0-->Absent  8. Sensory: 1-->Mild-to-moderate sensory loss, patient feels pinprick is less sharp or is dull on the affected side, or there is a loss of superficial pain with pinprick, but patient is aware of being touched  9. Best Language: 0-->No aphasia, normal  10. Dysarthria: 1-->Mild-to-moderate dysarthria, patient slurs at least some words and, at worst, can be understood with some difficulty  11. Extinction  and Inattention (formerly Neglect): 0-->No abnormality  Total (NIH Stroke Scale): 5       Modified Mera    Holden Coma Scale:    ABCD2 Score:    [id number] Score:   HAS -BLED Score:   ICH Score:   Hunt & Dewey Classification:      Hemorrhagic change of an Ischemic Stroke: Does this patient have an ischemic stroke with hemorrhagic changes? Yes, Grading Scale: HI Type 1 (HI-1) = small petechiae along the margins of the infarct. Is this a symptomatic change?  No - Hemorrhage is not clinically significant     Neurologic Chief Complaint: Right extremity weakness and right facial droop     Subjective:     Interval History: Patient is seen for follow-up neurological assessment and treatment recommendations: NAEON. Plts 41; discussed with Heme/Onc on call and no need for further platelet transfusions. Pt is stable for discharge home with outpatient therapy. ASA to be started on an outpatient basis pending improvement of pt's thrombocytopenia.     HPI, Past Medical, Family, and Social History remains the same as documented in the initial encounter.     Review of Systems   Constitutional: Negative for fatigue and fever.   Eyes: Negative for discharge and visual disturbance.   Neurological: Positive for facial asymmetry, speech difficulty, weakness and numbness.   Psychiatric/Behavioral: Negative for agitation, confusion and decreased concentration.     Scheduled Meds:    Continuous Infusions:  PRN Meds:    Objective:     Vital Signs (Most Recent):  Temp: 98.4 °F (36.9 °C) (09/12/19 1211)  Pulse: 79 (09/12/19 1211)  Resp: 16 (09/12/19 0757)  BP: 114/66 (09/12/19 1211)  SpO2: 96 % (09/12/19 1221)  BP Location: Left arm    Vital Signs Range (Last 24H):  Temp:  [97 °F (36.1 °C)-98.4 °F (36.9 °C)]   Pulse:  [77-87]   Resp:  [12-18]   BP: (103-126)/(60-69)   SpO2:  [94 %-96 %]   BP Location: Left arm    Physical Exam   Constitutional: She is oriented to person, place, and time. She appears well-developed. No distress.   HENT:    Head: Normocephalic and atraumatic.   Eyes: Conjunctivae and EOM are normal.   Cardiovascular: Normal rate.   Pulmonary/Chest: Effort normal. No respiratory distress.   Musculoskeletal: She exhibits no edema, tenderness or deformity.   Neurological: She is alert and oriented to person, place, and time. A sensory deficit is present. She exhibits normal muscle tone. Coordination normal.   Skin: Skin is warm and dry. She is not diaphoretic.   Psychiatric: She has a normal mood and affect. Her behavior is normal.       Neurological Exam:   LOC: alert  Attention Span: Good   Language: No aphasia  Articulation: Dysarthria  Orientation: Person, Place, Time   Visual Fields: Full  EOM (CN III, IV, VI): Full/intact  Facial Movement (CN VII): Lower facial weakness on the Left  Motor: Arm left  Paresis: 3/5  Leg left  Paresis: 4/5  Arm right  Normal 5/5  Leg right Normal 5/5  Cebellar: No evidence of appendicular or axial ataxia  Sensation: Enmanuel-hypoesthesia left  Tone: Normal tone throughout    Laboratory:  BMP:   Recent Labs   Lab 09/12/19  0435      K 3.9      CO2 23   BUN 10   CREATININE 0.7   CALCIUM 9.8     CBC:   Recent Labs   Lab 09/12/19  0643   WBC 2.20*   RBC 2.81*   HGB 9.3*   HCT 29.4*   PLT 41*   *   MCH 33.1*   MCHC 31.6*     Lipid Panel:   Recent Labs   Lab 09/08/19  1728   CHOL 181   LDLCALC 107.6   HDL 37*   TRIG 182*     Coagulation:   Recent Labs   Lab 09/08/19  1313   INR 0.9     Hgb A1C:   Recent Labs   Lab 09/08/19  1728   HGBA1C 4.5       Diagnostic Results     Brain Imaging     CT Head 9/10/19:  Evolving right MCA distribution infarct as above.  Small volume of subarachnoid hemorrhage, centered in the right sylvian fissure.  No new hemorrhage or infarction elsewhere.    MRI Brain WO Contrast 9/9/19  Mild moderate region of right MCA distribution acute/recent infarction with petechial susceptibility in the parietal lobe components concerning for petechial hemorrhagic  conversion.  There is localized mass effect without midline shift or hydrocephalus.  Scattered patchy and confluent foci of T2 FLAIR signal hyperintensity elsewhere supratentorial white matter with ill-defined component in the maura while nonspecific concerning for mild moderate underlying chronic ischemic change.    CT Head 9/8/19  No CT evidence of acute intracranial abnormality.  All CT scans at this facility use dose modulation, iterative reconstruction and/or weight based dosing when appropriate to reduce radiation dose to as low as reasonably achievable.      Vessel Imaging     Cerebral Angiogram 9/8/19  1. Cerebral angiogram demonstrates occlusion of the M2 segment of the right middle cerebral artery..  2. Aspiration thrombectomy was performed (ADAPT) Technique with restoration TICI 3 Flow in the right middle cerebral artery territory.    CTA Multiphase 9/8/19  Abrupt occlusion of the lateral M2 segment of the right MCA.  No significant parenchymal hypoattenuation, edema/ mass effect, or hemorrhage.  Stenosis of the distal right vertebral artery.  Partially imaged right suprahilar/ apical lung mass, correlating with patient's history of lung cancer.  Indeterminate 2.1 cm left parotid mass, possible pleomorphic adenoma.      Cardiac Imaging     TTE 9/9/19  · Concentric left ventricular remodeling.  · Normal left ventricular systolic function. The estimated ejection fraction is 65%  · Mild right ventricular enlargement.  · Normal right ventricular systolic function.  · Normal LV diastolic function.  · Normal central venous pressure (3 mm Hg).  · No wall motion abnormalities.  · No evidence of intracardiac shunting.      Sharon Hirsch PA-C  Comprehensive Stroke Center  Department of Vascular Neurology   [facility]

## 2019-10-02 ENCOUNTER — TELEPHONE (OUTPATIENT)
Dept: NEUROLOGY | Facility: CLINIC | Age: 54
End: 2019-10-02

## 2019-10-02 NOTE — TELEPHONE ENCOUNTER
----- Message from Felisha Vilchis RN sent at 9/12/2019  1:21 PM CDT -----  Please schedule a neuro follow up appointment in 4-6 weeks. Patient was inpatient and seen by Dr. Wilcox. Please contact patient with date and time of appointment.

## 2019-10-10 ENCOUNTER — OFFICE VISIT (OUTPATIENT)
Dept: NEUROLOGY | Facility: CLINIC | Age: 54
End: 2019-10-10
Payer: COMMERCIAL

## 2019-10-10 VITALS
HEIGHT: 63 IN | SYSTOLIC BLOOD PRESSURE: 129 MMHG | HEART RATE: 111 BPM | WEIGHT: 185.44 LBS | BODY MASS INDEX: 32.86 KG/M2 | DIASTOLIC BLOOD PRESSURE: 77 MMHG

## 2019-10-10 DIAGNOSIS — Z72.0 TOBACCO ABUSE: ICD-10-CM

## 2019-10-10 DIAGNOSIS — D69.6 THROMBOCYTOPENIA: ICD-10-CM

## 2019-10-10 DIAGNOSIS — Z86.73 HISTORY OF ISCHEMIC RIGHT MCA STROKE: ICD-10-CM

## 2019-10-10 DIAGNOSIS — I63.411 EMBOLIC STROKE INVOLVING RIGHT MIDDLE CEREBRAL ARTERY: ICD-10-CM

## 2019-10-10 DIAGNOSIS — E11.8 CONTROLLED DIABETES MELLITUS TYPE 2 WITH COMPLICATIONS, UNSPECIFIED WHETHER LONG TERM INSULIN USE: ICD-10-CM

## 2019-10-10 DIAGNOSIS — E78.2 MIXED HYPERLIPIDEMIA: ICD-10-CM

## 2019-10-10 PROCEDURE — 3008F BODY MASS INDEX DOCD: CPT | Mod: CPTII,S$GLB,, | Performed by: STUDENT IN AN ORGANIZED HEALTH CARE EDUCATION/TRAINING PROGRAM

## 2019-10-10 PROCEDURE — 3044F PR MOST RECENT HEMOGLOBIN A1C LEVEL <7.0%: ICD-10-PCS | Mod: CPTII,S$GLB,, | Performed by: STUDENT IN AN ORGANIZED HEALTH CARE EDUCATION/TRAINING PROGRAM

## 2019-10-10 PROCEDURE — 99999 PR PBB SHADOW E&M-EST. PATIENT-LVL V: ICD-10-PCS | Mod: PBBFAC,,, | Performed by: STUDENT IN AN ORGANIZED HEALTH CARE EDUCATION/TRAINING PROGRAM

## 2019-10-10 PROCEDURE — 99204 PR OFFICE/OUTPT VISIT, NEW, LEVL IV, 45-59 MIN: ICD-10-PCS | Mod: S$GLB,,, | Performed by: STUDENT IN AN ORGANIZED HEALTH CARE EDUCATION/TRAINING PROGRAM

## 2019-10-10 PROCEDURE — 99204 OFFICE O/P NEW MOD 45 MIN: CPT | Mod: S$GLB,,, | Performed by: STUDENT IN AN ORGANIZED HEALTH CARE EDUCATION/TRAINING PROGRAM

## 2019-10-10 PROCEDURE — 99999 PR PBB SHADOW E&M-EST. PATIENT-LVL V: CPT | Mod: PBBFAC,,, | Performed by: STUDENT IN AN ORGANIZED HEALTH CARE EDUCATION/TRAINING PROGRAM

## 2019-10-10 PROCEDURE — 3008F PR BODY MASS INDEX (BMI) DOCUMENTED: ICD-10-PCS | Mod: CPTII,S$GLB,, | Performed by: STUDENT IN AN ORGANIZED HEALTH CARE EDUCATION/TRAINING PROGRAM

## 2019-10-10 PROCEDURE — 3044F HG A1C LEVEL LT 7.0%: CPT | Mod: CPTII,S$GLB,, | Performed by: STUDENT IN AN ORGANIZED HEALTH CARE EDUCATION/TRAINING PROGRAM

## 2019-10-10 RX ORDER — MODAFINIL 200 MG/1
200 TABLET ORAL DAILY
COMMUNITY

## 2019-10-10 RX ORDER — METOPROLOL SUCCINATE 25 MG/1
25 TABLET, EXTENDED RELEASE ORAL DAILY
COMMUNITY

## 2019-10-10 NOTE — PATIENT INSTRUCTIONS
9/8/19 R M2 occlusion, received tPA and then [place] [facility] [place]   Admit NIH 16 Van+  Discharge NIH 5  Residual minor facial droop, LUE sensory change and hyperreflexia   Started on Atorvastatin, ASA deferred due to thrombocytopenia (platelets 41)    Please follow up regularly with your oncologist.    Recommendations from neurology based on reported lab work from 10/4 (Platelets 145)    - Recommend 81mg of Aspirin daily starting now until oncology gives opinion   - Based on guidelines for stroke secondary to hypercoagulability 2/2 malignancy, would recommend full dose anticoagulation (Lovenox vs NOAC) but defer to Oncology for this decision due to continuity of care.

## 2019-10-10 NOTE — ASSESSMENT & PLAN NOTE
- 9/8/19, acute infarct in the R MCA territory  - Suspected etiology likely embolic 2/2 hypercoagulable state from known SCLC  - ASA held while inpatient due to thrombocytopenia (Plt 41)     Plan  - Follow up with oncology prior to initialing nay anticoagulation, per most recent OSH labs, Plt 145 but pt is unsure of plan going foreward  - Recommend 81mg of Aspirin daily starting now until oncology gives opinion   - Based on guidelines for stroke secondary to hypercoagulability 2/2 malignancy, would recommend full dose anticoagulation (Lovenox vs NOAC) but defer to Oncology for this decision due to continuity of care (pt prefers to follow up in [place])  - Can continue Atorvastatin but cholesterol is wnl and likely stroke etiology known

## 2019-10-10 NOTE — ASSESSMENT & PLAN NOTE
- ASA deferred in setting of thrombocytopenia  - Most recent lab work from oncologist shows Plt 145  - See Embolic stroke involving right middle cerebral artery

## 2019-10-10 NOTE — ASSESSMENT & PLAN NOTE
- 9/8/19, acute infarct in the R MCA territory  - Suspected etiology likely embolic 2/2 hypercoagulable state from known SCLC  - ASA held while inpatient due to thrombocytopenia (Plt 41)     Plan  - Follow up with oncology prior to initialing nay anticoagulation, per most recent OSH labs, Plt 145 but pt is unsure of plan going foreward  - Recommend 81mg of Aspirin daily starting now until oncology gives opinion   - Based on guidelines for stroke secondary to hypercoagulability 2/2 malignancy, would recommend full dose anticoagulation (Lovenox vs NOAC) but defer to Oncology for this decision due to continuity of care (pt prefers to follow up in [place])  - Can continue Atorvastatin but cholesterol is wnl and likely stroke etiology known

## 2019-10-10 NOTE — PROGRESS NOTES
Neurology Clinic  Follow-up Visit    Patient Name: Beatriz Valente  MRN: [id number]    CC: R MCA Stroke     HPI: Beatriz Valente is a 54 y.o. year old female with a medical history significant for small cell lung carcinoma with metastasis to the liver receiving chemo and radiation, GERD, HLD and DM who presents for post hospital follow up. Pt initially presented to [facility] on 9/8/19 with complaints of left sided facial droop and left sided sensory loss. MRI Brain showed right MCA distribution acute infarction with petechial susceptibility in the parietal lobe components concerning for petechial hemorrhagic conversionTelestroke was activated with documented NIHSS 15 and VAN +. Pt was within the tPA window and after extensive discussions concerning possible metastatic disease, tPA was administered and she was transferred to [facility] for potential IR intervention. CTA multiphase showed right M2 occlusion and she was taken for IR intervention. Stroke etiology was suspected to be embolism 2/2 hypercoagulable state in the setting of known malignancy. Antithrombotics were deferred as pt was thrombocytopenic due to chemotherapy.     Since admission, pt states that she has continued to note left sided facial droop and sensation changes to her LUE.    Review of Systems   Constitutional: Negative for chills, diaphoresis, fever, malaise/fatigue and weight loss.   HENT: Negative for hearing loss and tinnitus.    Eyes: Negative for blurred vision, double vision and photophobia.   Respiratory: Negative for cough and shortness of breath.    Cardiovascular: Negative for chest pain and palpitations.   Gastrointestinal: Negative for constipation, diarrhea, nausea and vomiting.   Musculoskeletal: Positive for back pain. Negative for falls, joint pain, myalgias and neck pain.   Skin: Negative for itching and rash.   Neurological: Positive for tingling, sensory change and focal weakness. Negative for dizziness, tremors, speech change,  seizures, loss of consciousness, weakness and headaches.   Psychiatric/Behavioral: Negative for hallucinations, memory loss, substance abuse and suicidal ideas. The patient does not have insomnia.        Past Medical History  Past Medical History:   Diagnosis Date    Cancer 03/28/2018    Lung    Diabetes mellitus, type 2     Steriod induced, years ago, now glucose up and down    GERD (gastroesophageal reflux disease)     Hyperlipidemia     Sleep apnea        Medications    Current Outpatient Medications:     albuterol (PROAIR HFA) 90 mcg/actuation inhaler, Inhale 2 puffs into the lungs every 6 (six) hours as needed for Wheezing. Rescue, Disp: , Rfl:     ALPRAZolam (XANAX) 0.5 MG tablet, Take 0.5 mg by mouth daily as needed for Anxiety. , Disp: , Rfl:     docusate sodium (COLACE) 100 MG capsule, Take 100 mg by mouth 2 (two) times daily., Disp: , Rfl:     flaxseed oil 1,000 mg Cap, Take 1 capsule by mouth once daily., Disp: , Rfl:     fluticasone (FLONASE) 50 mcg/actuation nasal spray, 1 spray by Each Nare route daily as needed for Rhinitis., Disp: , Rfl:     ipratropium (ATROVENT) 0.03 % nasal spray, 2 sprays by Nasal route 3 (three) times daily., Disp: 30 mL, Rfl: 2    levothyroxine (SYNTHROID) 25 MCG tablet, Take 25 mcg by mouth once daily., Disp: , Rfl:     metoprolol succinate (TOPROL-XL) 25 MG 24 hr tablet, Take 25 mg by mouth once daily., Disp: , Rfl:     milk thistle 175 mg tablet, Take 175 mg by mouth once daily., Disp: , Rfl:     modafinil (PROVIGIL) 200 MG Tab, Take 200 mg by mouth once daily., Disp: , Rfl:     multivitamin capsule, Take 1 capsule by mouth once daily., Disp: , Rfl:     omega 3-dha-epa-fish oil (FISH OIL) 1,000 mg (120 mg-180 mg) Cap, Take 1 capsule by mouth once daily., Disp: , Rfl:     oxyCODONE-acetaminophen (PERCOCET)  mg per tablet, Take 1 tablet by mouth 2 (two) times daily., Disp: , Rfl:     pantoprazole (PROTONIX) 40 MG tablet, Take 40 mg by mouth once  daily., Disp: , Rfl:     polyethylene glycol (GLYCOLAX) 17 gram PwPk, Take by mouth., Disp: , Rfl:     fentaNYL 37.5 mcg/hour PT72, Place 37.5 mcg/hr onto the skin every 72 hours., Disp: , Rfl:     multivitamin (THERAGRAN) tablet, Take 1 tablet by mouth once daily., Disp: , Rfl:   Any other notable medications as documented in HPI    Allergies  Review of patient's allergies indicates:   Allergen Reactions    Rocephin [ceftriaxone] Shortness Of Breath    Augmentin [amoxicillin-pot clavulanate] Rash       Social History  Social History     Socioeconomic History    Marital status:      Spouse name: Not on file    Number of children: Not on file    Years of education: Not on file    Highest education level: Not on file   Occupational History    Not on file   Social Needs    Financial resource strain: Not on file    Food insecurity:     Worry: Not on file     Inability: Not on file    Transportation needs:     Medical: Not on file     Non-medical: Not on file   Tobacco Use    Smoking status: Current Every Day Smoker     Packs/day: 0.50     Types: Cigarettes     Start date: 1980    Smokeless tobacco: Never Used   Substance and Sexual Activity    Alcohol use: Yes     Comment: Occassional    Drug use: No    Sexual activity: Yes     Partners: Male   Lifestyle    Physical activity:     Days per week: Not on file     Minutes per session: Not on file    Stress: Not on file   Relationships    Social connections:     Talks on phone: Not on file     Gets together: Not on file     Attends Judaism service: Not on file     Active member of club or organization: Not on file     Attends meetings of clubs or organizations: Not on file     Relationship status: Not on file   Other Topics Concern    Not on file   Social History Narrative    Not on file     Any other notable Social History as documented in HPI.    Family History  No family history on file.  Any other notable FMH as documented in  "HPI.    Physical Exam  /77   Pulse (!) 111   Ht 5' 3" (1.6 m)   Wt 84.1 kg (185 lb 6.5 oz)   BMI 32.84 kg/m²     Physical Exam   Constitutional: She is oriented to person, place, and time. She appears well-developed and well-nourished. No distress.   HENT:   Head: Normocephalic and atraumatic.   Eyes: Pupils are equal, round, and reactive to light. EOM are normal.   Neck: Normal range of motion.   Cardiovascular: Normal rate and intact distal pulses.   Pulmonary/Chest: Effort normal. No respiratory distress.   Abdominal: Soft. There is no tenderness.   Musculoskeletal: Normal range of motion. She exhibits no edema, tenderness or deformity.   Neurological: She is alert and oriented to person, place, and time. She displays abnormal reflex. A cranial nerve deficit and sensory deficit is present. She exhibits normal muscle tone. Coordination normal.   Skin: Skin is warm and dry. She is not diaphoretic.   Nursing note and vitals reviewed.      Neurologic Exam: The patient is awake, alert and oriented. Language is fluent.  Fund of knowledge is appropriate.     Cranial nerves:   Pupils are round and reactive to light and accommodation.   Visual fields are full to confrontation.    Ocular motility is full in all cardinal positions of gaze.   Facial sensation is decreased on left side to light touch.   Facial activation shows minor left sided facial droop.  Hearing is symmetric bilaterally.   Shoulder elevation is symmetric and full strength bilaterally.   Tongue is midline and neck rotation strength is normal bilaterally.     Motor examination of all extremities demonstrates normal bulk and tone in all four limbs. There are no atrophy or fasciculations. Strength is 5/5 in the upper and lower extremities bilaterally with exception of LUE flexors 4+/5.    Sensory examination is decreased to light touch in LUE  Sensation to temperature: decreased in LUE  Sensation to vibration:decreased in LUE  Sensation to pinprick: " decreased in LUE  Proprioception: intact in BUE and BLE    Deep tendon reflexes are 2+ and symmetric in the lower extremities bilaterally.  3+ in LUE. No clonus, downgoing toes b/l.    Gait: Normal tandem, and casual gait.    Coordination: Finger to nose and heel to shin testing is normal in both upper and lower extremities.      Lab and Test Results    WBC   Date Value Ref Range Status   09/12/2019 2.20 (L) 3.90 - 12.70 K/uL Final   09/11/2019 1.70 (LL) 3.90 - 12.70 K/uL Final     Comment:     WBC critical result(s) called and verbal readback obtained from Sonny Curtis RN, 09/11/2019 05:23     09/10/2019 2.03 (L) 3.90 - 12.70 K/uL Final     Hemoglobin   Date Value Ref Range Status   09/12/2019 9.3 (L) 12.0 - 16.0 g/dL Final   09/11/2019 9.0 (L) 12.0 - 16.0 g/dL Final   09/10/2019 9.0 (L) 12.0 - 16.0 g/dL Final     Hematocrit   Date Value Ref Range Status   09/12/2019 29.4 (L) 37.0 - 48.5 % Final   09/11/2019 27.0 (L) 37.0 - 48.5 % Final   09/10/2019 27.3 (L) 37.0 - 48.5 % Final     Platelets   Date Value Ref Range Status   09/12/2019 41 (L) 150 - 350 K/uL Final   09/11/2019 37 (LL) 150 - 350 K/uL Final     Comment:     plt critical result(s) called and verbal readback obtained from   zeline jenelle,rn, 09/11/2019 07:49     09/10/2019 31 (LL) 150 - 350 K/uL Final     Comment:     PLT   critical result(s) called and verbal readback obtained from   YINKA PATTERSON RN, 09/10/2019 16:11       Glucose   Date Value Ref Range Status   09/12/2019 78 70 - 110 mg/dL Final   09/11/2019 84 70 - 110 mg/dL Final   09/10/2019 82 70 - 110 mg/dL Final     Sodium   Date Value Ref Range Status   09/12/2019 141 136 - 145 mmol/L Final   09/11/2019 141 136 - 145 mmol/L Final   09/10/2019 142 136 - 145 mmol/L Final     Potassium   Date Value Ref Range Status   09/12/2019 3.9 3.5 - 5.1 mmol/L Final   09/11/2019 3.9 3.5 - 5.1 mmol/L Final   09/10/2019 3.8 3.5 - 5.1 mmol/L Final     Chloride   Date Value Ref Range Status   09/12/2019 105 95 -  110 mmol/L Final   09/11/2019 107 95 - 110 mmol/L Final   09/10/2019 108 95 - 110 mmol/L Final     CO2   Date Value Ref Range Status   09/12/2019 23 23 - 29 mmol/L Final   09/11/2019 24 23 - 29 mmol/L Final   09/10/2019 24 23 - 29 mmol/L Final     BUN, Bld   Date Value Ref Range Status   09/12/2019 10 6 - 20 mg/dL Final   09/11/2019 10 6 - 20 mg/dL Final   09/10/2019 14 6 - 20 mg/dL Final     Creatinine   Date Value Ref Range Status   09/12/2019 0.7 0.5 - 1.4 mg/dL Final   09/11/2019 0.6 0.5 - 1.4 mg/dL Final   09/10/2019 0.6 0.5 - 1.4 mg/dL Final     Calcium   Date Value Ref Range Status   09/12/2019 9.8 8.7 - 10.5 mg/dL Final   09/11/2019 9.0 8.7 - 10.5 mg/dL Final   09/10/2019 9.1 8.7 - 10.5 mg/dL Final     Magnesium   Date Value Ref Range Status   09/12/2019 1.8 1.6 - 2.6 mg/dL Final   09/11/2019 1.7 1.6 - 2.6 mg/dL Final   09/10/2019 1.8 1.6 - 2.6 mg/dL Final     Phosphorus   Date Value Ref Range Status   09/12/2019 4.4 2.7 - 4.5 mg/dL Final   09/11/2019 3.6 2.7 - 4.5 mg/dL Final   09/10/2019 3.0 2.7 - 4.5 mg/dL Final     Alkaline Phosphatase   Date Value Ref Range Status   09/12/2019 105 55 - 135 U/L Final   09/11/2019 99 55 - 135 U/L Final   09/10/2019 95 55 - 135 U/L Final     ALT   Date Value Ref Range Status   09/12/2019 12 10 - 44 U/L Final   09/11/2019 12 10 - 44 U/L Final   09/10/2019 14 10 - 44 U/L Final     AST   Date Value Ref Range Status   09/12/2019 26 10 - 40 U/L Final   09/11/2019 19 10 - 40 U/L Final   09/10/2019 18 10 - 40 U/L Final       Images: All imaging has been personally reviewed by myself and staff.     CT Head 9/10/19:  Evolving right MCA distribution infarct as above.  Small volume of subarachnoid hemorrhage, centered in the right sylvian fissure.  No new hemorrhage or infarction elsewhere.     MRI Brain WO Contrast 9/9/19  Mild moderate region of right MCA distribution acute/recent infarction with petechial susceptibility in the parietal lobe components concerning for petechial  hemorrhagic conversion.  There is localized mass effect without midline shift or hydrocephalus.  Scattered patchy and confluent foci of T2 FLAIR signal hyperintensity elsewhere supratentorial white matter with ill-defined component in the maura while nonspecific concerning for mild moderate underlying chronic ischemic change.         CT Head 9/8/19  No CT evidence of acute intracranial abnormality.  All CT scans at this facility use dose modulation, iterative reconstruction and/or weight based dosing when appropriate to reduce radiation dose to as low as reasonably achievable.        Vessel Imaging      Cerebral Angiogram 9/8/19  1. Cerebral angiogram demonstrates occlusion of the M2 segment of the right middle cerebral artery..  2. Aspiration thrombectomy was performed (ADAPT) Technique with restoration TICI 3 Flow in the right middle cerebral artery territory.     CTA Multiphase 9/8/19  Abrupt occlusion of the lateral M2 segment of the right MCA.  No significant parenchymal hypoattenuation, edema/ mass effect, or hemorrhage.  Stenosis of the distal right vertebral artery.  Partially imaged right suprahilar/ apical lung mass, correlating with patient's history of lung cancer.  Indeterminate 2.1 cm left parotid mass, possible pleomorphic adenoma.        Cardiac Imaging      TTE 9/9/19  · Concentric left ventricular remodeling.  · Normal left ventricular systolic function. The estimated ejection fraction is 65%  · Mild right ventricular enlargement.  · Normal right ventricular systolic function.  · Normal LV diastolic function.  · Normal central venous pressure (3 mm Hg).  · No wall motion abnormalities.  · No evidence of intracardiac shunting.      Assessment and Plan    Problem List Items Addressed This Visit        Neuro    History of ischemic right MCA stroke    Embolic stroke involving right middle cerebral artery    Overview     9/8/19 R M2 occlusion, received tPA and then was transferred to [facility] for thrombectomy    Admit NIH 16 Van+  Discharge NIH 5  Residual minor facial droop, LUE sensory change and hyperreflexia   Started on Atorvastatin, ASA deferred due to thrombocytopenia (platelets 41)         Current Assessment & Plan     - 9/8/19, acute infarct in the R MCA territory  - Suspected etiology likely embolic 2/2 hypercoagulable state from known SCLC  - ASA held while inpatient due to thrombocytopenia (Plt 41)     Plan  - Follow up with oncology prior to initialing nay anticoagulation, per most recent OSH labs, Plt 145 but pt is unsure of plan going foreward  - Recommend 81mg of Aspirin daily starting now until oncology gives opinion   - Based on guidelines for stroke secondary to hypercoagulability 2/2 malignancy, would recommend full dose anticoagulation (Lovenox vs NOAC) but defer to Oncology for this decision due to continuity of care (pt prefers to follow up in [place])  - Can continue Atorvastatin but cholesterol is wnl and likely stroke etiology known             Cardiac/Vascular    Mixed hyperlipidemia    Current Assessment & Plan     - Started on Atorvastatin 40 while inpatient  - Cholesterol ok  - Discontinued per PCP            Hematology    Thrombocytopenia    Current Assessment & Plan     - ASA deferred in setting of thrombocytopenia  - Most recent lab work from oncologist shows Plt 145  - See Embolic stroke involving right middle cerebral artery            Endocrine    Diabetes mellitus type II, controlled    Current Assessment & Plan     - Continued management per PCP            Other    Tobacco abuse    Current Assessment & Plan     - Stopped per patient, spouse continues to smoke indoors                 Bryson Irby MD  Neurology Resident - PGY2  [facility]  [address] [place] [address]